# Patient Record
Sex: MALE | Race: WHITE | NOT HISPANIC OR LATINO | Employment: FULL TIME | URBAN - METROPOLITAN AREA
[De-identification: names, ages, dates, MRNs, and addresses within clinical notes are randomized per-mention and may not be internally consistent; named-entity substitution may affect disease eponyms.]

---

## 2017-12-07 ENCOUNTER — APPOINTMENT (EMERGENCY)
Dept: RADIOLOGY | Facility: HOSPITAL | Age: 57
End: 2017-12-07
Payer: COMMERCIAL

## 2017-12-07 ENCOUNTER — HOSPITAL ENCOUNTER (EMERGENCY)
Facility: HOSPITAL | Age: 57
Discharge: HOME | End: 2017-12-07
Attending: EMERGENCY MEDICINE
Payer: COMMERCIAL

## 2017-12-07 VITALS
WEIGHT: 180.56 LBS | SYSTOLIC BLOOD PRESSURE: 197 MMHG | BODY MASS INDEX: 23.18 KG/M2 | TEMPERATURE: 97.4 F | DIASTOLIC BLOOD PRESSURE: 90 MMHG | HEART RATE: 60 BPM | OXYGEN SATURATION: 96 % | RESPIRATION RATE: 16 BRPM

## 2017-12-07 DIAGNOSIS — S92.214A CLOSED NONDISPLACED FRACTURE OF CUBOID OF RIGHT FOOT, INITIAL ENCOUNTER: Primary | ICD-10-CM

## 2017-12-07 PROCEDURE — 73610 X-RAY EXAM OF ANKLE: CPT

## 2017-12-07 PROCEDURE — 73630 X-RAY EXAM OF FOOT: CPT

## 2017-12-07 PROCEDURE — 99283 EMERGENCY DEPT VISIT LOW MDM: CPT

## 2017-12-07 RX ORDER — IBUPROFEN 600 MG/1
600 TABLET ORAL ONCE
Status: COMPLETED | OUTPATIENT
Start: 2017-12-07 | End: 2017-12-07

## 2017-12-07 RX ORDER — HYDROCODONE BITARTRATE AND ACETAMINOPHEN 5; 325 MG/1; MG/1
1 TABLET ORAL EVERY 6 HOURS PRN
Qty: 12 TABLET | Refills: 0 | Status: SHIPPED | OUTPATIENT
Start: 2017-12-07 | End: 2017-12-10

## 2017-12-07 RX ADMIN — IBUPROFEN 600 MG: 600 TABLET ORAL at 09:13

## 2017-12-07 NOTE — DISCHARGE INSTRUCTIONS
No weight baring until further directed by Foot Doctor  Foot Fracture in [facility]:   A foot fracture is a break in one or more of the bones in your foot  Foot fractures are commonly caused by trauma, falls, or repeated stress injuries  DISCHARGE INSTRUCTIONS:   Medicines:   · Antibiotics: This medicine is given to help treat or prevent an infection caused by bacteria  · NSAIDs:  These medicines decrease swelling and pain  NSAIDs are available without a doctor's order  Ask which medicine is right for you  Ask how much to take and when to take it  Take as directed  NSAIDs can cause stomach bleeding and kidney problems if not taken correctly  · Pain medicine: You may be given a prescription medicine to decrease pain  Do not wait until the pain is severe before you take this medicine  · Take your medicine as directed  Contact your healthcare provider if you think your medicine is not helping or if you have side effects  Tell him of her if you are allergic to any medicine  Keep a list of the medicines, vitamins, and herbs you take  Include the amounts, and when and why you take them  Bring the list or the pill bottles to follow-up visits  Carry your medicine list with you in case of an emergency  Follow up with your healthcare provider or bone specialist as directed: You may need to return to have your splint or stitches removed  You may also need to return for tests to make sure your foot is healing  Write down your questions so you remember to ask them during your visits  Wound care:  Carefully wash the wound with soap and water  Dry the area and put on new, clean bandages as directed  Change your bandages when they get wet or dirty  Self-care:   · Rest:  You may need to rest your foot and avoid activities that cause pain  For stress fractures, you will need to avoid the activity that caused the fracture until it heals   Ask when you can return to your normal activities such as work and sports  · Ice:  Ice helps decrease swelling and pain  Ice may also help prevent tissue damage  Use an ice pack or put crushed ice in a plastic bag  Cover it with a towel, and place it on your foot for 15 to 20 minutes every hour as directed  · Elevate your foot:  Raise your foot at or above the level of your heart as often as you can  This will help decrease swelling and pain  Prop your foot on pillows or blankets to keep it elevated comfortably  · Physical therapy: Once your foot has healed, a physical therapist can teach you exercises to help improve movement and strength, and to decrease pain  Splint care:   · Check the skin around your splint daily for any redness or open areas  · Do not use a sharp or pointed object to scratch your skin under the splint  · Do not remove your splint unless your healthcare provider or orthopedic surgeon says it is okay  Bathing with a splint:  Do not let your splint get wet  Before bathing, cover the splint with a plastic bag  Tape the bag to your skin above the splint to seal out the water  Keep your foot out of the water in case the bag leaks  Ask when it is okay to take a bath or shower  Assistive devices: You may be given a hard-soled shoe to wear while your foot is healing  You also may need to use crutches to help you walk while your foot heals  It is important to use your crutches correctly  Ask for more information about how to use crutches  Contact your healthcare provider or bone specialist if:   · You have a fever  · You have new sores around your boot or splint  · You have new or worsening trouble moving your foot  · You notice a foul smell coming from under your splint  · Your boot or splint gets damaged  · You have questions or concerns about your condition or care  Return to the emergency department if:   · The pain in your injured foot gets worse even after you rest and take pain medicine      · The skin or toes of your foot become numb, swollen, cold, white, or blue  · You have more pain or swelling than you did before the splint was put on  · Your wound is draining fluid or pus  · Blood soaks through your bandage  · Your leg feels warm, tender, and painful  It may look swollen and red  · You suddenly feel lightheaded and short of breath  · You have chest pain when you take a deep breath or cough  You may cough up blood  © 2017 [facility] Information is for End User's use only and may not be sold, redistributed or otherwise used for commercial purposes  All illustrations and images included in CareNotes® are the copyrighted property of A D A M [employer] Inc  or Rafael Rincon  The above information is an  only  It is not intended as medical advice for individual conditions or treatments  Talk to your doctor, nurse or pharmacist before following any medical regimen to see if it is safe and effective for you

## 2017-12-07 NOTE — ED PROVIDER NOTES
History  Chief Complaint   Patient presents with    Foot Injury     63yo male who presents to ER for evaluation of right lateral foot injury  States he stepped off the curb onto a drainage grate when he rolled it inwards causing him to fall  Onset last night  He applied ice at the time but this morning pain, swelling, and bruising increased  Pain located along the lateral foot  He is having a hard time bearing weight on it  Denies other significant injury  Denies previous right foot/ankle surgery  History provided by:  Patient      Prior to Admission Medications   Prescriptions Last Dose Informant Patient Reported? Taking?   budesonide-formoterol (SYMBICORT) 160-4 5 mcg/act inhaler  Self Yes Yes   Sig: Inhale 2 puffs 2 (two) times a day  esomeprazole (NexIUM) 40 MG capsule  Self Yes Yes   Sig: Take 40 mg by mouth every morning before breakfast       Facility-Administered Medications: None       Past Medical History:   Diagnosis Date    COPD (chronic obstructive pulmonary disease) ([facility])     GERD (gastroesophageal reflux disease)        History reviewed  No pertinent surgical history  History reviewed  No pertinent family history  I have reviewed and agree with the history as documented  Social History   Substance Use Topics    Smoking status: Former Smoker     Years: 0 00    Smokeless tobacco: Never Used    Alcohol use Yes      Comment: occassional        Review of Systems   Constitutional: Negative for chills and fever  Musculoskeletal: Negative for joint swelling  Skin: Negative for rash and wound  Neurological: Negative for weakness and numbness         Physical Exam  ED Triage Vitals   Temperature Pulse Respirations Blood Pressure SpO2   12/07/17 0806 12/07/17 0806 12/07/17 0806 12/07/17 0806 12/07/17 0806   (!) 97 4 °F (36 3 °C) 65 18 (!) 198/94 96 %      Temp Source Heart Rate Source Patient Position - Orthostatic VS BP Location FiO2 (%)   12/07/17 0806 12/07/17 0850 12/07/17 8551 12/07/17 0806 --   Oral Monitor Lying Right arm       Pain Score       12/07/17 0806       Worst Possible Pain           Orthostatic Vital Signs  Vitals:    12/07/17 0806 12/07/17 0850   BP: (!) 198/94 (!) 197/90   Pulse: 65 60   Patient Position - Orthostatic VS: Lying Lying       Physical Exam   Constitutional: He appears well-developed and well-nourished  Cardiovascular: Intact distal pulses  Musculoskeletal:        Right ankle: Normal         Right foot: There is decreased range of motion, tenderness, bony tenderness and swelling  There is normal capillary refill, no crepitus and no deformity  Feet:    Skin: Skin is warm and dry  Nursing note and vitals reviewed  ED Medications  Medications   ibuprofen (MOTRIN) tablet 600 mg (600 mg Oral Given 12/7/17 0913)       Diagnostic Studies  Results Reviewed     None                 XR ankle 3+ views RIGHT   ED Interpretation by Juan Manuel Ramirez PA-C (12/07 0900)   Cuboid fracture      Final Result by Laura Mares MD (12/07 1759)      Acute fracture of the cuboid         Workstation performed: [id number]         XR foot 3+ views RIGHT   ED Interpretation by Juan Manuel Ramirez PA-C (12/07 0900)   Cuboid fracture      Final Result by Laura Mares MD (12/07 9677)      Acute fracture involving the lateral proximal portion of the cuboid towards the plantar surface  Brielle Zambrano is aware of this finding           Workstation performed: [id number]                    Procedures  Static Splint Application  Date/Time: 12/7/2017 9:24 AM  Performed by: Rahat Zarco by: Leslie Crain     Patient location:  Bedside  Procedure performed by emergency physician: No    Other Assisting Provider: Yes (comment) Shiv Moore  Tech)    Consent:     Consent obtained:  Verbal    Consent given by:  Patient    Risks discussed:  Pain  Universal protocol:     Procedure explained and questions answered to patient or proxy's satisfaction: yes      Patient identity confirmed:  Verbally with patient  Indication:     Indications: fracture    Pre-procedure details:     Sensation:  Normal  Procedure details:     Laterality:  Right    Location:  Foot    Splint type:  Short leg    Supplies:  Ortho-Glass  Post-procedure details:     Pain:  Improved    Sensation:  Normal    Neurovascular Exam: skin pink      Patient tolerance of procedure: Tolerated well, no immediate complications    Crutches also given       Phone Contacts  ED Phone Contact    ED Course  ED Course                                MDM  Number of Diagnoses or Management Options  Closed nondisplaced fracture of cuboid of right foot, initial encounter:      Amount and/or Complexity of Data Reviewed  Tests in the radiology section of CPT®: reviewed    Risk of Complications, Morbidity, and/or Mortality  General comments: Discussed with patient importance of being non weight baring until further directed by foot doctor, he understands and agrees to do so  I or my delegate reviewed this patient through the [facility] portal and did not find any evidence of narcotic abuse or doctor shopping  Patient Progress  Patient progress: stable    CritCare Time    Disposition  Final diagnoses:   Closed nondisplaced fracture of cuboid of right foot, initial encounter     Time reflects when diagnosis was documented in both MDM as applicable and the Disposition within this note     Time User Action Codes Description Comment    12/7/2017  9:22 AM Marielena March Add [G52 214A] Closed nondisplaced fracture of cuboid of right foot, initial encounter       ED Disposition     ED Disposition Condition Comment    Discharge  [facility] discharge to home/self care  Condition at discharge: Good        Follow-up Information     Follow up With Specialties Details Why Contact Info Additional Information    Peyton Perry DPM Podiatry In 3 days  [address] [place] [address]  [facility] Emergency Department Emergency Medicine  If symptoms worsen [address] [facility] [address]  [phone] [facility] ED, [address], [place], [place], [address]        Patient's Medications   Discharge Prescriptions    HYDROCODONE-ACETAMINOPHEN (NORCO) 5-325 MG PER TABLET    Take 1 tablet by mouth every 6 (six) hours as needed for pain for up to 3 days Max Daily Amount: 4 tablets       Start Date: 12/7/2017 End Date: 12/10/2017       Order Dose: 1 tablet       Quantity: 12 tablet    Refills: 0     No discharge procedures on file      ED Provider  Electronically Signed by           Ofelia Urias PA-C  12/07/17 Edison Cifuentes PA-C  12/07/17 2125

## 2018-07-29 ENCOUNTER — APPOINTMENT (EMERGENCY)
Dept: CT IMAGING | Facility: HOSPITAL | Age: 58
End: 2018-07-29
Payer: COMMERCIAL

## 2018-07-29 ENCOUNTER — HOSPITAL ENCOUNTER (EMERGENCY)
Facility: HOSPITAL | Age: 58
Discharge: HOME | End: 2018-07-29
Attending: EMERGENCY MEDICINE | Admitting: EMERGENCY MEDICINE
Payer: COMMERCIAL

## 2018-07-29 VITALS
HEART RATE: 90 BPM | DIASTOLIC BLOOD PRESSURE: 100 MMHG | RESPIRATION RATE: 18 BRPM | SYSTOLIC BLOOD PRESSURE: 189 MMHG | OXYGEN SATURATION: 95 % | TEMPERATURE: 98.5 F
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
## 2018-07-29 DIAGNOSIS — S09.90XA INJURY OF HEAD, INITIAL ENCOUNTER: ICD-10-CM

## 2018-07-29 DIAGNOSIS — S01.81XA FACIAL LACERATION, INITIAL ENCOUNTER: ICD-10-CM

## 2018-07-29 PROCEDURE — 70450 CT HEAD/BRAIN W/O DYE: CPT

## 2018-07-29 PROCEDURE — 70486 CT MAXILLOFACIAL W/O DYE: CPT

## 2018-07-29 PROCEDURE — 99284 EMERGENCY DEPT VISIT MOD MDM: CPT

## 2018-07-29 RX ORDER — OXYCODONE HYDROCHLORIDE AND ACETAMINOPHEN 5; 325 MG/1; MG/1
1 TABLET ORAL ONCE
Status: COMPLETED | OUTPATIENT
Start: 2018-07-29 | End: 2018-07-29

## 2018-07-29 RX ORDER — OXYCODONE HYDROCHLORIDE AND ACETAMINOPHEN 5; 325 MG/1; MG/1
1 TABLET ORAL EVERY 4 HOURS PRN
Qty: 5 TABLET | Refills: 0 | Status: SHIPPED | OUTPATIENT
Start: 2018-07-29 | End: 2018-08-08

## 2018-07-29 RX ORDER — ACETAMINOPHEN 325 MG/1
975 TABLET ORAL ONCE
Status: COMPLETED | OUTPATIENT
Start: 2018-07-29 | End: 2018-07-29

## 2018-07-29 RX ORDER — OXYCODONE HYDROCHLORIDE AND ACETAMINOPHEN 5; 325 MG/1; MG/1
1 TABLET ORAL EVERY 4 HOURS PRN
Qty: 5 TABLET | Refills: 0 | Status: SHIPPED | OUTPATIENT
Start: 2018-07-29 | End: 2018-07-29

## 2018-07-29 RX ADMIN — ACETAMINOPHEN 975 MG: 325 TABLET, FILM COATED ORAL at 18:38

## 2018-07-29 RX ADMIN — OXYCODONE HYDROCHLORIDE AND ACETAMINOPHEN 1 TABLET: 5; 325 TABLET ORAL at 19:24

## 2018-07-29 NOTE — ED NOTES
Unsure if tetanus UTD, checked our records, no recent tetanus noted     Edson Sterling, KOLTON  07/29/18 6836

## 2018-07-29 NOTE — DISCHARGE INSTRUCTIONS
Head Injury   WHAT YOU NEED TO KNOW:   What causes a head injury? A head injury is most often caused by a blow to the head  This may occur from a fall, bicycle injury, sports injury, being struck in the head, or a motor vehicle accident  What are the symptoms of a head injury? You may have an open wound, swelling, or bruising on your head  Right after the injury, you may be confused  Symptoms may last anywhere from a few hours to a few weeks  You may have any of the following:  · Mild to moderate headache    · Dizziness or loss of balance    · Nausea or vomiting    · Change in mood, such as feeling restless or irritable    · Trouble thinking, remembering, or concentrating    · Ringing in the ears or neck pain    · Drowsiness or decreased amount of energy    · Trouble sleeping  How is a head injury diagnosed? · Tell your healthcare provider about your injury and symptoms  The provider will do an exam to check your brain function  He or she will check how your pupils react to light  He will check your memory, hand grasp, and balance  · You may need a CT scan to check for bleeding or major damage to your skull or brain  You may be given contrast liquid to help the pictures show up better  Tell a healthcare provider if you have ever had an allergic reaction to contrast liquid  How is a head injury treated? You may be given medicine to decrease pain  Other treatments may depend on how severe your head injury is  How can I manage my symptoms? · Rest  or do quiet activities for 24 to 48 hours  Limit your time watching TV, using the computer, or doing tasks that require a lot of thinking  Slowly return to your normal activities as directed  Do not play sports or do activities that may cause you to get hit in the head  Ask your healthcare provider when you can return to sports  · Apply ice  on your head for 15 to 20 minutes every hour or as directed  Use an ice pack, or put crushed ice in a plastic bag  Cover it with a towel before you apply it to your skin  Ice helps prevent tissue damage and decreases swelling and pain  · Have someone stay with you for 24 hours  or as directed  This person can monitor you for complications and call 123  When you are awake the person should ask you a few questions to see if you are thinking clearly  An example would be to ask your name or your address  How can I help prevent another head injury? · Wear a helmet that fits properly  Do this when you play sports, or ride a bike, scooter, or skateboard  Helmets help decrease your risk of a serious head injury  Talk to your healthcare provider about other ways you can protect yourself if you play sports  · Wear your seat belt every time you are in a car  This helps to decrease your risk for a head injury if you are in a car accident  Call 911 or have someone else call for any of the following:   · You cannot be woken  · You have a seizure  · You stop responding to others or you faint  · You have blurry or double vision  · Your speech becomes slurred or confused  · You have arm or leg weakness, loss of feeling, or new problems with coordination  · Your pupils are larger than usual or one pupil is a different size than the other  · You have blood or clear fluid coming out of your ears or nose  When should I seek immediate care? · You have repeated or forceful vomiting  · You feel confused  · Your headache gets worse or becomes severe  · You or someone caring for you notices that you are harder to wake than usual   When should I contact my healthcare provider? · Your symptoms last longer than 6 weeks after the injury  · You have questions or concerns about your condition or care  CARE AGREEMENT:   You have the right to help plan your care  Learn about your health condition and how it may be treated  Discuss treatment options with your caregivers to decide what care you want to receive  You always have the right to refuse treatment  The above information is an  only  It is not intended as medical advice for individual conditions or treatments  Talk to your doctor, nurse or pharmacist before following any medical regimen to see if it is safe and effective for you  © 2017 [employer] Information is for End User's use only and may not be sold, redistributed or otherwise used for commercial purposes  All illustrations and images included in CareNotes® are the copyrighted property of [employer]  or Rafael French   WHAT YOU NEED TO KNOW:   A laceration is an injury to the skin and the soft tissue underneath it  Lacerations happen when you are cut or hit by something  They can happen anywhere on the body  DISCHARGE INSTRUCTIONS:   Return to the emergency department if:   · You have heavy bleeding or bleeding that does not stop after 10 minutes of holding firm, direct pressure over the wound  · Your wound opens up  Contact your healthcare provider if:   · You have a fever or chills  · Your laceration is red, warm, or swollen  · You have red streaks on your skin coming from your wound  · You have white or yellow drainage from the wound that smells bad  · You have pain that gets worse, even after treatment  · You have questions or concerns about your condition or care  Medicines:   · Prescription pain medicine  may be given  Ask how to take this medicine safely  · Antibiotics  help treat or prevent a bacterial infection  · Take your medicine as directed  Contact your healthcare provider if you think your medicine is not helping or if you have side effects  Tell him or her if you are allergic to any medicine  Keep a list of the medicines, vitamins, and herbs you take  Include the amounts, and when and why you take them  Bring the list or the pill bottles to follow-up visits   Carry your medicine list with you in case of an emergency  Care for your wound as directed:   · Do not get your wound wet  until your healthcare provider says it is okay  Do not soak your wound in water  Do not go swimming until your healthcare provider says it is okay  Carefully wash the wound with soap and water  Gently pat the area dry or allow it to air dry  · Change your bandages  when they get wet, dirty, or after washing  Apply new, clean bandages as directed  Do not apply elastic bandages or tape too tight  Do not put powders or lotions over your incision  · Apply antibiotic ointment as directed  Your healthcare provider may give you antibiotic ointment to put over your wound if you have stitches  If you have strips of tape over your incision, let them dry up and fall off on their own  If they do not fall off within 14 days, gently remove them  If you have glue over your wound, do not remove or pick at it  If your glue comes off, do not replace it with glue that you have at home  · Check your wound every day for signs of infection such as swelling, redness, or pus  Self-care:   · Apply ice  on your wound for 15 to 20 minutes every hour or as directed  Use an ice pack, or put crushed ice in a plastic bag  Cover it with a towel  Ice helps prevent tissue damage and decreases swelling and pain  · Use a splint as directed  A splint will decrease movement and stress on your wound  It may help it heal faster  A splint may be used for lacerations over joints or areas of your body that bend  Ask your healthcare provider how to apply and remove a splint  · Decrease scarring of your wound  by applying ointments as directed  Do not apply ointments until your healthcare provider says it is okay  You may need to wait until your wound is healed  Ask which ointment to buy and how often to use it  After your wound is healed, use sunscreen over the area when you are out in the sun   You should do this for at least 6 months to 1 year after your injury  Follow up with your healthcare provider as directed: You may need to follow up in 24 to 48 hours to have your wound checked for infection  You will need to return in 3 to 14 days if you have stitches or staples so they can be removed  Care for your wound as directed to prevent infection and help it heal  Write down your questions so you remember to ask them during your visits  © 2017 [employer] Information is for End User's use only and may not be sold, redistributed or otherwise used for commercial purposes  All illustrations and images included in CareNotes® are the copyrighted property of [employer]  or Rafael Rincon  The above information is an  only  It is not intended as medical advice for individual conditions or treatments  Talk to your doctor, nurse or pharmacist before following any medical regimen to see if it is safe and effective for you  Motor Vehicle Accident   WHAT YOU NEED TO KNOW:   A motor vehicle accident (MVA) can cause injury from the impact or from being thrown around inside the car  You may have a bruise on your abdomen, chest, or neck from the seatbelt  You may also have pain in your face, neck, or back  You may have pain in your knee, hip, or thigh if your body hits the dash or the steering wheel  Muscle pain is commonly worse 1 to 2 days after an MVA  DISCHARGE INSTRUCTIONS:   Call 911 if:   · You have new or worsening chest pain or shortness of breath  Return to the emergency department if:   · You have new or worsening pain in your abdomen  · You have nausea and vomiting that does not get better  · You have a severe headache  · You have weakness, tingling, or numbness in your arms or legs  · You have new or worsening pain that makes it hard for you to move  Contact your healthcare provider if:   · You have pain that develops 2 to 3 days after the MVA       · You have questions or concerns about your condition or care  Medicines:   · Pain medicine: You may be given medicine to take away or decrease pain  Do not wait until the pain is severe before you take your medicine  · NSAIDs , such as ibuprofen, help decrease swelling, pain, and fever  This medicine is available with or without a doctor's order  NSAIDs can cause stomach bleeding or kidney problems in certain people  If you take blood thinner medicine, always ask if NSAIDs are safe for you  Always read the medicine label and follow directions  Do not give these medicines to children under 10months of age without direction from your child's healthcare provider  · Take your medicine as directed  Contact your healthcare provider if you think your medicine is not helping or if you have side effects  Tell him of her if you are allergic to any medicine  Keep a list of the medicines, vitamins, and herbs you take  Include the amounts, and when and why you take them  Bring the list or the pill bottles to follow-up visits  Carry your medicine list with you in case of an emergency  Follow up with your healthcare provider as directed:  Write down your questions so you remember to ask them during your visits  Safety tips:   · Always wear your seatbelt  This will help reduce serious injury from an MVA  · Use child safety seats  Your child needs to ride in a child safety seat made for his age, height, and weight  Ask your healthcare provider for more information about child safety seats  · Decrease speed  Drive the speed limit to reduce your risk for an MVA  · Do not drive if you are tired  You will react more slowly when you are tired  The slowed reaction time will increase your risk for an MVA  · Do not talk or text on your cell phone while you drive  You cannot respond fast enough in an emergency if you are distracted by texts or conversations  · Do not drink and drive  Use a designated    Call a taxi or get a ride home with someone if you have been drinking  Do not let your friends drive if they have been drinking alcohol  · Do not use illegal drugs and drive  You may be more tired or take risks that you normally would not take  Do not drive after you take prescription medicines that make you sleepy  Self-care:   · Use ice and heat  Ice helps decrease swelling and pain  Ice may also help prevent tissue damage  Use an ice pack, or put crushed ice in a plastic bag  Cover it with a towel and apply to your injured area for 15 to 20 minutes every hour, or as directed  After 2 days, use a heating pad on your injured area  Use heat as directed  · Gently stretch  Use gentle exercises to stretch your muscles after an MVA  Ask your healthcare provider for exercises you can do  © 2017 [employer] Information is for End User's use only and may not be sold, redistributed or otherwise used for commercial purposes  All illustrations and images included in CareNotes® are the copyrighted property of [employer]  or Rafael Mckenzie  The above information is an  only  It is not intended as medical advice for individual conditions or treatments  Talk to your doctor, nurse or pharmacist before following any medical regimen to see if it is safe and effective for you  Skin Adhesive Care   WHAT YOU NEED TO KNOW:   What is skin adhesive? Skin adhesive is medical glue used to close wounds  It is a substitute for staples and stitches  Skin adhesive wound closures take less time and do not require anesthesia  You have less pain and a lower risk of infection than with staples or stitches  Skin adhesive will fall off after the wound is healed  How do I care for the skin adhesive that covers my wound? · Keep your wound clean and dry  for 1 to 5 days  You can shower 24 hours after the skin adhesive is applied  Lightly pat your wound dry after you shower      · Do not soak  your wound in water, such as in a bath or hot tub  · Do not scrub  your wound or pick at the adhesive  This can make your wound reopen  · Do not apply ointments  to your wound  These include antibiotic and other ointments that contain petroleum jelly  These products will remove skin adhesive and reopen your wound  When should I contact my healthcare provider? · You have a fever  · Your wound is red, swollen, and warm to touch  · You have questions or concerns about your condition or care  When should I seek immediate care? · Your wound is draining pus  · Your wound opens  CARE AGREEMENT:   You have the right to help plan your care  Learn about your health condition and how it may be treated  Discuss treatment options with your caregivers to decide what care you want to receive  You always have the right to refuse treatment  The above information is an  only  It is not intended as medical advice for individual conditions or treatments  Talk to your doctor, nurse or pharmacist before following any medical regimen to see if it is safe and effective for you  © 2017 [employer] Information is for End User's use only and may not be sold, redistributed or otherwise used for commercial purposes  All illustrations and images included in CareNotes® are the copyrighted property of A [employer]  or Rafael Rincon
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
## 2018-12-07 ENCOUNTER — APPOINTMENT (OUTPATIENT)
Dept: URGENT CARE | Facility: MEDICAL CENTER | Age: 58
End: 2018-12-07
Payer: OTHER MISCELLANEOUS

## 2018-12-07 PROCEDURE — 90715 TDAP VACCINE 7 YRS/> IM: CPT

## 2019-01-08 ENCOUNTER — TELEPHONE (OUTPATIENT)
Dept: UROLOGY | Facility: AMBULATORY SURGERY CENTER | Age: 59
End: 2019-01-08

## 2019-01-08 NOTE — TELEPHONE ENCOUNTER
Reason for appointment/Complaint/Diagnosis : KIDNEY STONES, CYST OF KIDNEY    Insurance: BLUE CROSS    History of Cancer? MINOR SKIN                        If yes, what kind? Previous urologist?   30YR AGO, CANT REMEMBER NAME              Records requested/where? ALREADY REC    Outside testing/where? Location Preference for office visit?  [place]

## 2019-02-14 ENCOUNTER — OFFICE VISIT (OUTPATIENT)
Dept: UROLOGY | Facility: CLINIC | Age: 59
End: 2019-02-14
Payer: COMMERCIAL

## 2019-02-14 VITALS
SYSTOLIC BLOOD PRESSURE: 116 MMHG | BODY MASS INDEX: 26.49 KG/M2 | DIASTOLIC BLOOD PRESSURE: 82 MMHG | HEIGHT: 74 IN | HEART RATE: 71 BPM | WEIGHT: 206.4 LBS

## 2019-02-14 DIAGNOSIS — N20.0 CALCULUS OF KIDNEY: Primary | ICD-10-CM

## 2019-02-14 DIAGNOSIS — Z12.5 SCREENING FOR PROSTATE CANCER: ICD-10-CM

## 2019-02-14 PROCEDURE — 99244 OFF/OP CNSLTJ NEW/EST MOD 40: CPT | Performed by: UROLOGY

## 2019-02-14 RX ORDER — FAMOTIDINE 40 MG/1
TABLET, FILM COATED ORAL
COMMUNITY
Start: 2019-02-12

## 2019-02-14 RX ORDER — HYDROCODONE BITARTRATE AND ACETAMINOPHEN 5; 325 MG/1; MG/1
TABLET ORAL
COMMUNITY

## 2019-02-14 RX ORDER — OXYCODONE HYDROCHLORIDE AND ACETAMINOPHEN 5; 325 MG/1; MG/1
1 TABLET ORAL 2 TIMES DAILY PRN
Refills: 0 | COMMUNITY
Start: 2019-02-04

## 2019-02-14 RX ORDER — OXYCODONE HYDROCHLORIDE AND ACETAMINOPHEN 5; 325 MG/1; MG/1
TABLET ORAL EVERY 12 HOURS
COMMUNITY

## 2019-02-14 RX ORDER — FAMOTIDINE 40 MG/1
TABLET, FILM COATED ORAL
COMMUNITY
Start: 2018-08-23

## 2019-02-14 RX ORDER — HYDROCODONE BITARTRATE AND ACETAMINOPHEN 5; 300 MG/1; MG/1
TABLET ORAL
COMMUNITY

## 2019-08-05 ENCOUNTER — APPOINTMENT (OUTPATIENT)
Dept: URGENT CARE | Age: 59
End: 2019-08-05
Payer: OTHER MISCELLANEOUS

## 2019-08-05 ENCOUNTER — TRANSCRIBE ORDERS (OUTPATIENT)
Dept: ADMINISTRATIVE | Age: 59
End: 2019-08-05

## 2019-08-05 ENCOUNTER — APPOINTMENT (OUTPATIENT)
Dept: RADIOLOGY | Age: 59
End: 2019-08-05
Payer: OTHER MISCELLANEOUS

## 2019-08-05 DIAGNOSIS — T14.90XA INJURY: Primary | ICD-10-CM

## 2019-08-05 DIAGNOSIS — T14.90XA INJURY: ICD-10-CM

## 2019-08-05 PROCEDURE — 99284 EMERGENCY DEPT VISIT MOD MDM: CPT | Performed by: PREVENTIVE MEDICINE

## 2019-08-05 PROCEDURE — 73140 X-RAY EXAM OF FINGER(S): CPT

## 2019-08-05 PROCEDURE — G0383 LEV 4 HOSP TYPE B ED VISIT: HCPCS | Performed by: PREVENTIVE MEDICINE

## 2019-12-02 ENCOUNTER — APPOINTMENT (EMERGENCY)
Dept: RADIOLOGY | Facility: HOSPITAL | Age: 59
End: 2019-12-02
Payer: COMMERCIAL

## 2019-12-02 ENCOUNTER — HOSPITAL ENCOUNTER (EMERGENCY)
Facility: HOSPITAL | Age: 59
Discharge: HOME | End: 2019-12-02
Attending: EMERGENCY MEDICINE | Admitting: EMERGENCY MEDICINE
Payer: COMMERCIAL

## 2019-12-02 VITALS
WEIGHT: 206.35 LBS | SYSTOLIC BLOOD PRESSURE: 178 MMHG | RESPIRATION RATE: 18 BRPM | TEMPERATURE: 97.8 F | BODY MASS INDEX: 26.49 KG/M2 | OXYGEN SATURATION: 96 % | HEART RATE: 60 BPM | DIASTOLIC BLOOD PRESSURE: 88 MMHG

## 2019-12-02 DIAGNOSIS — R07.9 CHEST PAIN, UNSPECIFIED: Primary | ICD-10-CM

## 2019-12-02 LAB
ANION GAP SERPL CALCULATED.3IONS-SCNC: 9 MMOL/L (ref 4–13)
BASOPHILS # BLD AUTO: 0.07 THOUSANDS/ΜL (ref 0–0.1)
BASOPHILS NFR BLD AUTO: 1 % (ref 0–1)
BUN SERPL-MCNC: 17 MG/DL (ref 5–25)
CALCIUM SERPL-MCNC: 9.1 MG/DL (ref 8.3–10.1)
CHLORIDE SERPL-SCNC: 106 MMOL/L (ref 100–108)
CO2 SERPL-SCNC: 28 MMOL/L (ref 21–32)
CREAT SERPL-MCNC: 1 MG/DL (ref 0.6–1.3)
EOSINOPHIL # BLD AUTO: 0.15 THOUSAND/ΜL (ref 0–0.61)
EOSINOPHIL NFR BLD AUTO: 2 % (ref 0–6)
ERYTHROCYTE [DISTWIDTH] IN BLOOD BY AUTOMATED COUNT: 12.7 % (ref 11.6–15.1)
GFR SERPL CREATININE-BSD FRML MDRD: 83 ML/MIN/1.73SQ M
GLUCOSE SERPL-MCNC: 90 MG/DL (ref 65–140)
HCT VFR BLD AUTO: 47.9 % (ref 36.5–49.3)
HGB BLD-MCNC: 16.1 G/DL (ref 12–17)
IMM GRANULOCYTES # BLD AUTO: 0.01 THOUSAND/UL (ref 0–0.2)
IMM GRANULOCYTES NFR BLD AUTO: 0 % (ref 0–2)
LYMPHOCYTES # BLD AUTO: 1.69 THOUSANDS/ΜL (ref 0.6–4.47)
LYMPHOCYTES NFR BLD AUTO: 23 % (ref 14–44)
MCH RBC QN AUTO: 30.8 PG (ref 26.8–34.3)
MCHC RBC AUTO-ENTMCNC: 33.6 G/DL (ref 31.4–37.4)
MCV RBC AUTO: 92 FL (ref 82–98)
MONOCYTES # BLD AUTO: 0.47 THOUSAND/ΜL (ref 0.17–1.22)
MONOCYTES NFR BLD AUTO: 6 % (ref 4–12)
NEUTROPHILS # BLD AUTO: 4.97 THOUSANDS/ΜL (ref 1.85–7.62)
NEUTS SEG NFR BLD AUTO: 68 % (ref 43–75)
NRBC BLD AUTO-RTO: 0 /100 WBCS
PLATELET # BLD AUTO: 206 THOUSANDS/UL (ref 149–390)
PMV BLD AUTO: 10.6 FL (ref 8.9–12.7)
POTASSIUM SERPL-SCNC: 4 MMOL/L (ref 3.5–5.3)
RBC # BLD AUTO: 5.22 MILLION/UL (ref 3.88–5.62)
SODIUM SERPL-SCNC: 143 MMOL/L (ref 136–145)
TROPONIN I SERPL-MCNC: 0 NG/ML
WBC # BLD AUTO: 7.36 THOUSAND/UL (ref 4.31–10.16)

## 2019-12-02 PROCEDURE — 93005 ELECTROCARDIOGRAM TRACING: CPT

## 2019-12-02 PROCEDURE — 85025 COMPLETE CBC W/AUTO DIFF WBC: CPT | Performed by: PSYCHIATRY & NEUROLOGY

## 2019-12-02 PROCEDURE — 99285 EMERGENCY DEPT VISIT HI MDM: CPT

## 2019-12-02 PROCEDURE — 36415 COLL VENOUS BLD VENIPUNCTURE: CPT | Performed by: PSYCHIATRY & NEUROLOGY

## 2019-12-02 PROCEDURE — 80048 BASIC METABOLIC PNL TOTAL CA: CPT | Performed by: PSYCHIATRY & NEUROLOGY

## 2019-12-02 PROCEDURE — 99285 EMERGENCY DEPT VISIT HI MDM: CPT | Performed by: EMERGENCY MEDICINE

## 2019-12-02 PROCEDURE — 71046 X-RAY EXAM CHEST 2 VIEWS: CPT

## 2019-12-02 PROCEDURE — 84484 ASSAY OF TROPONIN QUANT: CPT | Performed by: PSYCHIATRY & NEUROLOGY

## 2019-12-02 RX ORDER — SODIUM CHLORIDE 9 MG/ML
3 INJECTION INTRAVENOUS AS NEEDED
Status: DISCONTINUED | OUTPATIENT
Start: 2019-12-02 | End: 2019-12-02 | Stop reason: HOSPADM

## 2019-12-02 NOTE — ED PROVIDER NOTES
History  Chief Complaint   Patient presents with    Chest Pain     pt c/o chest pain on and off for weeks  denies chest pain currently  sent in by PCP for further eval     Grisel Cortes is a 63 y o  male with past medical history significant for COPD and GERD who presents for chest pain which occurred at least 2 weeks ago and has not recurred  Pt states he was at his PCP today for a well check and recalled that he had several episodes of left/substernal chest pain radiating to his left arm which lasted 15-20 mins at most over the course of about 3 days more than 2 weeks ago and the pain has not reoccurred since then  Pt is an  and frequently lifts heavy objects and he thinks he possibly had a minor injury a few weeks ago as a result  Pt denies diaphoresis, nausea, vomiting, fever, chills, blurry vision, shortness of breath during episodes of chest pain  History provided by:  Patient and significant other   used: No    Chest Pain   Pain location:  Substernal area, L chest and L lateral chest  Pain quality: sharp and stabbing    Pain radiates to:  L shoulder and L arm  Pain radiates to the back: no    Timing:  Sporadic  Progression:  Resolved  Chronicity:  New  Context: not breathing    Relieved by:  Nothing  Worsened by:  Nothing tried  Ineffective treatments:  None tried  Associated symptoms: no cough, no dizziness, no fever, no nausea, no palpitations, no shortness of breath, not vomiting and no weakness        Prior to Admission Medications   Prescriptions Last Dose Informant Patient Reported? Taking? HYDROcodone-acetaminophen (NORCO) 5-325 mg per tablet  Self Yes No   Sig: TAKE 1 TABLET BY MOUTH EVERY DAY AS NEEDED   HYDROcodone-acetaminophen (VICODIN) 5-300 MG per tablet  Self Yes No   Sig: Vicodin 5 mg-300 mg tablet   budesonide-formoterol (SYMBICORT) 160-4 5 mcg/act inhaler  Self Yes No   Sig: Inhale 2 puffs 2 (two) times a day     esomeprazole (NexIUM) 40 MG capsule  Self Yes No   Sig: Take 40 mg by mouth every morning before breakfast    famotidine (PEPCID) 40 MG tablet  Self Yes No   famotidine (PEPCID) 40 MG tablet  Self Yes No   Sig: famotidine 40 mg tablet   fluticasone-umeclidinium-vilanterol (TRELEGY ELLIPTA) 100-62 5-25 MCG/INH inhaler  Self Yes No   Si puff od   fluticasone-umeclidinium-vilanterol (TRELEGY ELLIPTA) 100-62 5-25 MCG/INH inhaler  Self Yes No   Sig: Inhale 1 puff   oxyCODONE-acetaminophen (PERCOCET) 5-325 mg per tablet  Self Yes No   Sig: Every 12 hours   oxyCODONE-acetaminophen (PERCOCET) 5-325 mg per tablet  Self Yes No   Sig: Take 1 tablet by mouth 2 (two) times a day as needed      Facility-Administered Medications: None       Past Medical History:   Diagnosis Date    Cancer ([facility] [address] )     skin    [facility]ch[facility] disease[facility])     GERD (gastroesophageal reflux disease)        Past Surgical History:   Procedure Laterality Date    HERNIA REPAIR         Family History   Problem Relation Age of Onset    Hypertension Mother      I have reviewed and agree with the history as documented  Social History     Tobacco Use    Smoking status: Former Smoker     Years: 0 00    Smokeless tobacco: Never Used   Substance Use Topics    Alcohol use: Yes     Comment: occassional    Drug use: No        Review of Systems   Constitutional: Negative for activity change, chills, fever and unexpected weight change  Respiratory: Negative for cough, choking, chest tightness and shortness of breath  Cardiovascular: Positive for chest pain  Negative for palpitations and leg swelling  Gastrointestinal: Negative for constipation, diarrhea, nausea and vomiting  Musculoskeletal: Negative for gait problem  Neurological: Negative for dizziness, weakness and light-headedness         Physical Exam  ED Triage Vitals   Temperature Pulse Respirations Blood Pressure SpO2   19 1606 19 1606 19 1606 19 1606 19 1606   97 8 °F (36 6 °C) 68 16 (!) 207/87 98 %      Temp src Heart Rate Source Patient Position - Orthostatic VS BP Location FiO2 (%)   -- 12/02/19 1745 12/02/19 1745 12/02/19 1745 --    Monitor Sitting Right arm       Pain Score       12/02/19 1606       No Pain             Orthostatic Vital Signs  Vitals:    12/02/19 1606 12/02/19 1745   BP: (!) 207/87 (!) 178/88   Pulse: 68 60   Patient Position - Orthostatic VS:  Sitting       Physical Exam   Constitutional: He is oriented to person, place, and time  He appears well-developed and well-nourished  No distress  HENT:   Head: Normocephalic and atraumatic  Eyes: Pupils are equal, round, and reactive to light  Conjunctivae and EOM are normal    Neck: Normal range of motion  Neck supple  Cardiovascular: Normal rate, regular rhythm, normal heart sounds and intact distal pulses  No murmur heard  Pulmonary/Chest: Effort normal and breath sounds normal  No respiratory distress  He has no wheezes  He has no rhonchi  He has no rales  Abdominal: Soft  Bowel sounds are normal    Musculoskeletal: Normal range of motion  Neurological: He is alert and oriented to person, place, and time  Skin: Skin is warm and dry  Capillary refill takes less than 2 seconds  Psychiatric: He has a normal mood and affect  Nursing note and vitals reviewed        ED Medications  Medications   sodium chloride (PF) 0 9 % injection 3 mL (has no administration in time range)       Diagnostic Studies  Results Reviewed     Procedure Component Value Units Date/Time    Troponin I [[id number]] Collected:  12/02/19 1701    Lab Status:  Final result Specimen:  Blood from Arm, Right Updated:  12/02/19 1729     Troponin I 0 00 ng/mL     Basic metabolic panel [[id number]] Collected:  12/02/19 1701    Lab Status:  Final result Specimen:  Blood from Arm, Right Updated:  12/02/19 1720     Sodium 143 mmol/L      Potassium 4 0 mmol/L      Chloride 106 mmol/L      CO2 28 mmol/L      ANION GAP 9 mmol/L      BUN 17 mg/dL Creatinine 1 00 mg/dL      Glucose 90 mg/dL      Calcium 9 1 mg/dL      eGFR 83 ml/min/1 73sq m     Narrative:       [place] guidelines for Chronic Kidney Disease (CKD):     Stage 1 with normal or high GFR (GFR > 90 mL/min/1 73 square meters)    Stage 2 Mild CKD (GFR = 60-89 mL/min/1 73 square meters)    Stage 3A Moderate CKD (GFR = 45-59 mL/min/1 73 square meters)    Stage 3B Moderate CKD (GFR = 30-44 mL/min/1 73 square meters)    Stage 4 Severe CKD (GFR = 15-29 mL/min/1 73 square meters)    Stage 5 End Stage CKD (GFR <15 mL/min/1 73 square meters)  Note: GFR calculation is accurate only with a steady state creatinine    CBC and differential [[id number]] Collected:  12/02/19 1701    Lab Status:  Final result Specimen:  Blood from Arm, Right Updated:  12/02/19 1709     WBC 7 36 Thousand/uL      RBC 5 22 Million/uL      Hemoglobin 16 1 g/dL      Hematocrit 47 9 %      MCV 92 fL      MCH 30 8 pg      MCHC 33 6 g/dL      RDW 12 7 %      MPV 10 6 fL      Platelets 238 Thousands/uL      nRBC 0 /100 WBCs      Neutrophils Relative 68 %      Immat GRANS % 0 %      Lymphocytes Relative 23 %      Monocytes Relative 6 %      Eosinophils Relative 2 %      Basophils Relative 1 %      Neutrophils Absolute 4 97 Thousands/µL      Immature Grans Absolute 0 01 Thousand/uL      Lymphocytes Absolute 1 69 Thousands/µL      Monocytes Absolute 0 47 Thousand/µL      Eosinophils Absolute 0 15 Thousand/µL      Basophils Absolute 0 07 Thousands/µL                  X-ray chest 2 views   ED Interpretation by Kayla Allen DO (12/02 1708)   Flattened hemidiaphragms  No infiltrates              Procedures  ECG 12 Lead Documentation Only  Date/Time: 12/2/2019 4:48 PM  Performed by: Macrina Washington MD  Authorized by: Macrnia Washington MD     Indications / Diagnosis:  Chest pain  ECG reviewed by me, the ED Provider: yes    Patient location:  ED  Previous ECG:     Previous ECG:  Compared to current    Similarity: No change  Interpretation:     Interpretation: normal    Rate:     ECG rate:  60    ECG rate assessment: normal    Rhythm:     Rhythm: sinus rhythm    Ectopy:     Ectopy: none    QRS:     QRS axis:  Normal    QRS intervals:  Normal  Conduction:     Conduction: normal    ST segments:     ST segments:  Normal  T waves:     T waves: normal            ED Course         HEART Risk Score      Most Recent Value   History  0 Filed at: 12/02/2019 1732   ECG  0 Filed at: 12/02/2019 1732   Age  1 Filed at: 12/02/2019 1732   Risk Factors  1 Filed at: 12/02/2019 1732   Troponin  0 Filed at: 12/02/2019 1732   Heart Score Risk Calculator   History  0 Filed at: 12/02/2019 1732   ECG  0 Filed at: 12/02/2019 1732   Age  1 Filed at: 12/02/2019 1732   Risk Factors  1 Filed at: 12/02/2019 1732   Troponin  0 Filed at: 12/02/2019 1732   HEART Score  2 Filed at: 12/02/2019 1732   HEART Score  2 Filed at: 12/02/2019 1732                            MDM  Number of Diagnoses or Management Options  Chest pain, unspecified: new and requires workup  Diagnosis management comments: Mackenzie Mccarthy is a 63 y o  Male with no past cardiac history who presented for follow up on chest pain experienced > 2 weeks ago which resolved and has not returned  He went to his PCP who recommended he present to the ED for troponin's and stress test follow up  Pt labs were unremarkable, troponin was 0 00, CXR was unremarkable, HEART score of 2 from age and 2 risk factors  Pt counseled that stress test prescription should come from PCP or cardiologist so that there is adequate follow up as indicated  Pt given referral for cardiology should he desire to follow up with cardiology instead of PCP for stress test  Pt BP was elevated at presentation however on chart check, pt typically between 110-130 SBP and BP decreased during his ED stay  Pt and his wife have no further concerns and pt was discharged home         Amount and/or Complexity of Data Reviewed  Clinical lab tests: ordered and reviewed  Tests in the radiology section of CPT®: ordered and reviewed  Review and summarize past medical records: yes  Independent visualization of images, tracings, or specimens: yes    Risk of Complications, Morbidity, and/or Mortality  Presenting problems: moderate  Diagnostic procedures: moderate  Management options: moderate    Patient Progress  Patient progress: stable        Disposition  Final diagnoses:   Chest pain, unspecified     Time reflects when diagnosis was documented in both MDM as applicable and the Disposition within this note     Time User Action Codes Description Comment    12/2/2019  5:46 PM Bella Del Castillociro Add [R07 9] Chest pain, unspecified       ED Disposition     ED Disposition Condition Date/Time Comment    Discharge Stable Mon Dec 2, 2019  5:46 PM Hina Crowe discharge to home/self care              Follow-up Information     Follow up With Specialties Details Why Contact Info Additional Information    Dilcia Daly MD Internal Medicine Schedule an appointment as soon as possible for a visit in 1 week have your PCP order an excersise stress test, if at presentation you require a nuclear stress test, they can change it at that time [id number]  [facility] Cardiology Schedule an appointment as soon as possible for a visit  if you would like cardiology follow up [phone] Cardiology [address] [place] [address], [facility]          Discharge Medication List as of 12/2/2019  5:49 PM      CONTINUE these medications which have NOT CHANGED    Details   budesonide-formoterol (SYMBICORT) 160-4 5 mcg/act inhaler Inhale 2 puffs 2 (two) times a day , Historical Med      esomeprazole (NexIUM) 40 MG capsule Take 40 mg by mouth every morning before breakfast , Historical Med      !! famotidine (PEPCID) 40 MG tablet Starting Tue 2/12/2019, Historical Med      !! famotidine (PEPCID) 40 MG tablet famotidine 40 mg tablet, Historical Med      !! fluticasone-umeclidinium-vilanterol (TRELEGY ELLIPTA) 100-62 5-25 MCG/INH inhaler 1 puff od, Historical Med      !! fluticasone-umeclidinium-vilanterol (TRELEGY ELLIPTA) 100-62 5-25 MCG/INH inhaler Inhale 1 puff, Starting Thu 8/23/2018, Historical Med      HYDROcodone-acetaminophen (NORCO) 5-325 mg per tablet TAKE 1 TABLET BY MOUTH EVERY DAY AS NEEDED, Historical Med      HYDROcodone-acetaminophen (VICODIN) 5-300 MG per tablet Vicodin 5 mg-300 mg tablet, Historical Med      !! oxyCODONE-acetaminophen (PERCOCET) 5-325 mg per tablet Every 12 hours, Historical Med      !! oxyCODONE-acetaminophen (PERCOCET) 5-325 mg per tablet Take 1 tablet by mouth 2 (two) times a day as needed, Starting Mon 2/4/2019, Historical Med       !! - Potential duplicate medications found  Please discuss with provider  No discharge procedures on file  ED Provider  Attending physically available and evaluated Wang Jose QUIROGA managed the patient along with the ED Attending      Electronically Signed by         Luis Gerber MD  12/02/19 9770

## 2019-12-02 NOTE — ED ATTENDING ATTESTATION
12/2/2019  Vanda QUIROGA DO, saw and evaluated the patient  I have discussed the patient with the resident/non-physician practitioner and agree with the resident's/non-physician practitioner's findings, Plan of Care, and MDM as documented in the resident's/non-physician practitioner's note, except where noted  All available labs and Radiology studies were reviewed  I was present for key portions of any procedure(s) performed by the resident/non-physician practitioner and I was immediately available to provide assistance  At this point I agree with the current assessment done in the Emergency Department  I have conducted an independent evaluation of this patient a history and physical is as follows:    Patient is a 60-year-old male with a history of COPD who presents with an episodes of chest pain  Patient states that the episodes of chest pain occurred 2 weeks ago  He describes a sharp chest pain which lasted minutes at a time  It occurred several times over the course of the day  He has not had any chest pain since  He mentioned the episode during his routine visit with his PCP today  His PCP performed an EKG in the office and sent him to the emergency department for further evaluation  Patient is asymptomatic at this time  He specifically denies any chest pain, shortness of breath, diaphoresis, vomiting or other complaints  On exam, patient is resting comfortably  RRR  Breath sounds normal   No lower extremity edema or calf tenderness  Patient was initially hypertensive on exam but blood pressure has trended down without intervention  Patient remains asymptomatic  EKG and troponin with no evidence of acute ischemia  Do not suspect ACS, aortic dissection, pneumothorax, pericardial tamponade, pulmonary embolism, esophageal rupture as cause of chest pain  HEART score completed and patient is considered low risk for adverse cardiac event   Shared decision making used and patient prefers discharge and outpatient follow up  Patient will follow up with PCP to facilitate further workup  Advised to return to ED immediately if symptoms return  ED Course         Critical Care Time  ECG 12 Lead Documentation Only  Date/Time: 12/2/2019 4:47 PM  Performed by: Víctor Ahmadi DO  Authorized by: Víctor Ahmadi DO     ECG reviewed by me, the ED Provider: yes    Patient location:  ED  Previous ECG:     Previous ECG:  Compared to current    Similarity:  No change    Comparison to cardiac monitor: Yes    Comments:      Normal sinus rhythm at a rate 60 beats per minute  Normal intervals  Normal axis  Normal QRS  No ST T wave abnormalities  Similar to previous from 02/18/2013

## 2019-12-03 NOTE — PROGRESS NOTES
2/14/2019    Lorena Bergeron  1960  [id number]    Discussion and Plan    Dietary and hydration recommendations provided to minimize future stone risk  Imaging shows the largest of these calculi to be about 5 mm  I therefore have recommended surveillance for the time being  He is to contact office should symptoms developed  He will otherwise undergo KUB, PSA, and repeat urinalysis in 1 year  Review existing urinalyses actually shows no micro hematuria  This therefore can be observed  All questions answered at this time  1  Calculus of kidney  - Urinalysis with microscopic; Future  - XR abdomen 1 view kub; Future    2  Screening for prostate cancer  - PSA Total, Diagnostic; Future      Assessment      Patient Active Problem List   Diagnosis    Calculus of kidney    Screening for prostate cancer       History of Present Illness    Maggie Leo is a 63 y o  male seen today in regards to a history of incidentally discovered bilateral nephrolithiasis  This is noted on a CT scan initially performed for micro hematuria  Patient denies any prior bouts of flank pain or renal colic  Notes fair to good urinary flow with nocturia times 3-4 and occasional incomplete bladder emptying sensation  No history of gross hematuria urinary tract infection  He is a remote smoker having quit greater than 12 years ago  There is a strong family history of nephrolithiasis in patient's sister      Urinary Symptom Assessment        Past Medical History  Past Medical History:   Diagnosis Date    Cancer ([address] )     skin    COPD (chronic obstructive pulmonary disease) (HCC)     GERD (gastroesophageal reflux disease)        Past Social History  Past Surgical History:   Procedure Laterality Date    HERNIA REPAIR         Past Family History  Family History   Problem Relation Age of Onset    Hypertension Mother        Past Social history  Social History     Socioeconomic History    Marital status: /Civil Union     Spouse name: Not Outpatient Physical Therapy Peds Treatment Note      Patient Name: Reed Watkins  : 2001  MRN: [id number]  Today's Date: 12/3/Reed including banda with adaptive swings and Planetary Resources and Lookingglass Cyber Solutionsation on file    Number of children: Not on file    Years of education: Not on file    Highest education level: Not on file   Occupational History    Not on file   Social Needs    Financial resource strain: Not on file    Food insecurity:     Worry: Not on file     Inability: Not on file    Transportation needs:     Medical: Not on file     Non-medical: Not on file   Tobacco Use    Smoking status: Former Smoker     Years: 0 00    Smokeless tobacco: Never Used   Substance and Sexual Activity    Alcohol use: Yes     Comment: occassional    Drug use: No    Sexual activity: Not on file   Lifestyle    Physical activity:     Days per week: Not on file     Minutes per session: Not on file    Stress: Not on file   Relationships    Social connections:     Talks on phone: Not on file     Gets together: Not on file     Attends [employer] service: Not on file     Active member of club or organization: Not on file     Attends meetings of clubs or organizations: Not on file     Relationship status: Not on file    Intimate partner violence:     Fear of current or ex partner: Not on file     Emotionally abused: Not on file     Physically abused: Not on file     Forced sexual activity: Not on file   Other Topics Concern    Not on file   Social History Narrative    Not on file       Current Medications  Current Outpatient Medications   Medication Sig Dispense Refill    budesonide-formoterol (SYMBICORT) 160-4 5 mcg/act inhaler Inhale 2 puffs 2 (two) times a day        esomeprazole (NexIUM) 40 MG capsule Take 40 mg by mouth every morning before breakfast       famotidine (PEPCID) 40 MG tablet       famotidine (PEPCID) 40 MG tablet famotidine 40 mg tablet      fluticasone-umeclidinium-vilanterol (TRELEGY ELLIPTA) 100-62 5-25 MCG/INH inhaler 1 puff od      fluticasone-umeclidinium-vilanterol (TRELEGY ELLIPTA) 100-62 5-25 MCG/INH inhaler Inhale 1 puff      HYDROcodone-acetaminophen (NORCO) 5-325 mg per tablet TAKE 1 TABLET BY MOUTH EVERY DAY AS NEEDED      HYDROcodone-acetaminophen (VICODIN) 5-300 MG per tablet Vicodin 5 mg-300 mg tablet      oxyCODONE-acetaminophen (PERCOCET) 5-325 mg per tablet Every 12 hours      oxyCODONE-acetaminophen (PERCOCET) 5-325 mg per tablet Take 1 tablet by mouth 2 (two) times a day as needed  0     No current facility-administered medications for this visit  Allergies  No Known Allergies    Past Medical History, Social History, Family History, medications and allergies were reviewed  Review of Systems  Review of Systems   Constitutional: Negative  HENT: Negative  Eyes: Negative  Respiratory: Negative  Cardiovascular: Negative  Gastrointestinal: Negative  Endocrine: Negative  Genitourinary: Negative for decreased urine volume, difficulty urinating, hematuria and urgency  Musculoskeletal: Negative  Skin: Negative  Neurological: Negative  Hematological: Negative  Psychiatric/Behavioral: Negative  Vitals  Vitals:    02/14/19 1004   BP: 116/82   BP Location: Left arm   Patient Position: Sitting   Cuff Size: Adult   Pulse: 71   Weight: 93 6 kg (206 lb 6 4 oz)   Height: 6' 2" (1 88 m)         Physical Exam    Physical Exam   Constitutional: He is oriented to person, place, and time  He appears well-developed and well-nourished  HENT:   Head: Normocephalic and atraumatic  Eyes: Pupils are equal, round, and reactive to light  Neck: Normal range of motion  Cardiovascular: Normal rate, regular rhythm and normal heart sounds  Pulmonary/Chest: Effort normal and breath sounds normal  No accessory muscle usage  No respiratory distress  Abdominal: Soft  Normal appearance and bowel sounds are normal  There is no tenderness  Genitourinary: Rectum normal, prostate normal and penis normal  No penile tenderness  Genitourinary Comments: Prostate greater than 40 g  No nodules   Musculoskeletal: Normal range of motion     Neurological: He is alert and oriented to person, place, and time  Skin: Skin is warm, dry and intact  Psychiatric: He has a normal mood and affect  His speech is normal  Cognition and memory are normal    Nursing note and vitals reviewed  Results    Below listed labs, pathology results, and radiology images were personally reviewed:    No results found for: PSA  Lab Results   Component Value Date    GLUCOSE 99 12/27/2015    CALCIUM 8 2 (L) 12/27/2015     12/27/2015    K 3 5 12/27/2015    CO2 32 3 (H) 12/27/2015     12/27/2015    BUN 18 12/27/2015    CREATININE 1 12 12/27/2015     Lab Results   Component Value Date    WBC 9 57 12/27/2015    HGB 16 0 12/27/2015    HCT 45 9 12/27/2015    MCV 91 12/27/2015     12/27/2015       No results found for this or any previous visit (from the past 1 hour(s))  ]    Impression: Bilateral intrarenal calculi  Left upper pole renal cyst             Workstation:[id number]   Result Narrative   CT abdomen pelvis unenhanced    Indication: Hematuria low back pain    Procedure: Helical sections through the abdomen pelvis without contrast material    Comparison: None    Findings: The lung bases are clear  No pleural or pericardial effusion  The liver gallbladder pancreas and spleen are unremarkable  Normal adrenals  Left upper pole 2 2 cm exophytic cyst 10 HU  Tiny left lower  pole lateral capsular calcification  Possible right midpole lateral  intraparenchymal cyst  Right numerous than left intrarenal calculi  The largest  calculus left lower pole measures 5 mm  This is faintly visible on   The  right renal calculi are not visible on all on the   No hydronephrosis or  distal calculi  Urinary bladder partially distended  Prostate seminal vesicles  are not enlarged  Minimal vascular calcification  Retroperitoneum otherwise  unremarkable  Nondilated bowel  No free fluid or air or external hernia  Bony structures generally unremarkable     Other Result Information   Interface, Rad Results In - 12/14/2018  9:13 AM EST  CT abdomen pelvis unenhanced    Indication: Hematuria low back pain    Procedure: Helical sections through the abdomen pelvis without contrast material    Comparison: None    Findings: The lung bases are clear  No pleural or pericardial effusion  The liver gallbladder pancreas and spleen are unremarkable  Normal adrenals  Left upper pole 2 2 cm exophytic cyst 10 HU  Tiny left lower  pole lateral capsular calcification  Possible right midpole lateral  intraparenchymal cyst  Right numerous than left intrarenal calculi  The largest  calculus left lower pole measures 5 mm  This is faintly visible on   The  right renal calculi are not visible on all on the   No hydronephrosis or  distal calculi  Urinary bladder partially distended  Prostate seminal vesicles  are not enlarged  Minimal vascular calcification  Retroperitoneum otherwise  unremarkable  Nondilated bowel  No free fluid or air or external hernia  Bony structures generally unremarkable  IMPRESSION:  Impression: Bilateral intrarenal calculi  Left upper pole renal cyst        Range & Units 11/2/18  9:50 AM    Specific Gravity, Urine 1 003 - 1 030  1 018    pH, Urine 4 5 - 8 0  7    Protein, Urine NEG mg/dL Negative    Glucose, Urine NEG mg/dL Negative    Ketone, Urine NEG mg/dL Negative    Blood, Urine NEG mg/dL 0 03-0  19Abnormal     Leukocytes, Urine NEG /uL Negative    Nitrite, Urine NEG  Negative    Comment   SEE NOTES    Comment: Microscopic to follow     RBC/HPF 0 - 2 /hpf Rare    WBC/HPF 0 - 5 /hpf 0-2    Mucous Threads   Few    Specimen Collected: 11/02/18  9:50 AM Last Resulted: 11/02/18 11:13 PM   Received From: [facility]  Result Received: 02/13/19  6:18 AM 72825 Gait Training                                 15  52791 Neuromuscular Reeducation       20  [id number] There Act                                     25        Carmella Cortez, PT, MSPT  12/3/2019

## 2019-12-04 LAB
ATRIAL RATE: 60 BPM
P AXIS: 53 DEGREES
PR INTERVAL: 168 MS
QRS AXIS: 0 DEGREES
QRSD INTERVAL: 90 MS
QT INTERVAL: 406 MS
QTC INTERVAL: 406 MS
T WAVE AXIS: 52 DEGREES
VENTRICULAR RATE: 60 BPM

## 2019-12-04 PROCEDURE — 93010 ELECTROCARDIOGRAM REPORT: CPT | Performed by: INTERNAL MEDICINE

## 2020-02-13 DIAGNOSIS — Z12.5 SCREENING FOR PROSTATE CANCER: Primary | ICD-10-CM

## 2020-02-13 DIAGNOSIS — N20.0 CALCULUS OF KIDNEY: ICD-10-CM

## 2021-03-10 DIAGNOSIS — Z23 ENCOUNTER FOR IMMUNIZATION: ICD-10-CM

## 2022-03-10 ENCOUNTER — APPOINTMENT (EMERGENCY)
Dept: RADIOLOGY | Facility: HOSPITAL | Age: 62
End: 2022-03-10
Payer: COMMERCIAL

## 2022-03-10 ENCOUNTER — HOSPITAL ENCOUNTER (EMERGENCY)
Facility: HOSPITAL | Age: 62
Discharge: HOME | End: 2022-03-10
Attending: EMERGENCY MEDICINE
Payer: COMMERCIAL

## 2022-03-10 VITALS
RESPIRATION RATE: 18 BRPM | TEMPERATURE: 98 F | DIASTOLIC BLOOD PRESSURE: 99 MMHG | OXYGEN SATURATION: 96 % | HEART RATE: 62 BPM | SYSTOLIC BLOOD PRESSURE: 170 MMHG

## 2022-03-10 DIAGNOSIS — R07.9 CHEST PAIN: Primary | ICD-10-CM

## 2022-03-10 DIAGNOSIS — R03.0 ELEVATED BLOOD PRESSURE READING: ICD-10-CM

## 2022-03-10 LAB
2HR DELTA HS TROPONIN: 0 NG/L
ALBUMIN SERPL BCP-MCNC: 3.8 G/DL (ref 3.5–5)
ALP SERPL-CCNC: 88 U/L (ref 46–116)
ALT SERPL W P-5'-P-CCNC: 29 U/L (ref 12–78)
ANION GAP SERPL CALCULATED.3IONS-SCNC: 6 MMOL/L (ref 4–13)
AST SERPL W P-5'-P-CCNC: 15 U/L (ref 5–45)
BASOPHILS # BLD AUTO: 0.08 THOUSANDS/ΜL (ref 0–0.1)
BASOPHILS NFR BLD AUTO: 1 % (ref 0–1)
BILIRUB SERPL-MCNC: 0.67 MG/DL (ref 0.2–1)
BUN SERPL-MCNC: 14 MG/DL (ref 5–25)
CALCIUM SERPL-MCNC: 9.2 MG/DL (ref 8.3–10.1)
CARDIAC TROPONIN I PNL SERPL HS: 3 NG/L
CARDIAC TROPONIN I PNL SERPL HS: 3 NG/L
CHLORIDE SERPL-SCNC: 106 MMOL/L (ref 100–108)
CO2 SERPL-SCNC: 28 MMOL/L (ref 21–32)
CREAT SERPL-MCNC: 1.06 MG/DL (ref 0.6–1.3)
D DIMER PPP FEU-MCNC: 0.4 UG/ML FEU
EOSINOPHIL # BLD AUTO: 0.13 THOUSAND/ΜL (ref 0–0.61)
EOSINOPHIL NFR BLD AUTO: 2 % (ref 0–6)
ERYTHROCYTE [DISTWIDTH] IN BLOOD BY AUTOMATED COUNT: 12.6 % (ref 11.6–15.1)
GFR SERPL CREATININE-BSD FRML MDRD: 75 ML/MIN/1.73SQ M
GLUCOSE SERPL-MCNC: 99 MG/DL (ref 65–140)
HCT VFR BLD AUTO: 48.4 % (ref 36.5–49.3)
HGB BLD-MCNC: 16.7 G/DL (ref 12–17)
IMM GRANULOCYTES # BLD AUTO: 0.02 THOUSAND/UL (ref 0–0.2)
IMM GRANULOCYTES NFR BLD AUTO: 0 % (ref 0–2)
LYMPHOCYTES # BLD AUTO: 1.39 THOUSANDS/ΜL (ref 0.6–4.47)
LYMPHOCYTES NFR BLD AUTO: 18 % (ref 14–44)
MAGNESIUM SERPL-MCNC: 2.2 MG/DL (ref 1.6–2.6)
MCH RBC QN AUTO: 30.4 PG (ref 26.8–34.3)
MCHC RBC AUTO-ENTMCNC: 34.5 G/DL (ref 31.4–37.4)
MCV RBC AUTO: 88 FL (ref 82–98)
MONOCYTES # BLD AUTO: 0.54 THOUSAND/ΜL (ref 0.17–1.22)
MONOCYTES NFR BLD AUTO: 7 % (ref 4–12)
NEUTROPHILS # BLD AUTO: 5.65 THOUSANDS/ΜL (ref 1.85–7.62)
NEUTS SEG NFR BLD AUTO: 72 % (ref 43–75)
NRBC BLD AUTO-RTO: 0 /100 WBCS
NT-PROBNP SERPL-MCNC: 73 PG/ML
PLATELET # BLD AUTO: 199 THOUSANDS/UL (ref 149–390)
PMV BLD AUTO: 10.5 FL (ref 8.9–12.7)
POTASSIUM SERPL-SCNC: 4.2 MMOL/L (ref 3.5–5.3)
PROT SERPL-MCNC: 7.6 G/DL (ref 6.4–8.2)
RBC # BLD AUTO: 5.49 MILLION/UL (ref 3.88–5.62)
SODIUM SERPL-SCNC: 140 MMOL/L (ref 136–145)
WBC # BLD AUTO: 7.81 THOUSAND/UL (ref 4.31–10.16)

## 2022-03-10 PROCEDURE — 85025 COMPLETE CBC W/AUTO DIFF WBC: CPT | Performed by: PHYSICIAN ASSISTANT

## 2022-03-10 PROCEDURE — 99285 EMERGENCY DEPT VISIT HI MDM: CPT

## 2022-03-10 PROCEDURE — 84484 ASSAY OF TROPONIN QUANT: CPT | Performed by: PHYSICIAN ASSISTANT

## 2022-03-10 PROCEDURE — 80053 COMPREHEN METABOLIC PANEL: CPT | Performed by: PHYSICIAN ASSISTANT

## 2022-03-10 PROCEDURE — 93005 ELECTROCARDIOGRAM TRACING: CPT

## 2022-03-10 PROCEDURE — 83735 ASSAY OF MAGNESIUM: CPT | Performed by: PHYSICIAN ASSISTANT

## 2022-03-10 PROCEDURE — 71045 X-RAY EXAM CHEST 1 VIEW: CPT

## 2022-03-10 PROCEDURE — 99285 EMERGENCY DEPT VISIT HI MDM: CPT | Performed by: PHYSICIAN ASSISTANT

## 2022-03-10 PROCEDURE — 85379 FIBRIN DEGRADATION QUANT: CPT | Performed by: PHYSICIAN ASSISTANT

## 2022-03-10 PROCEDURE — 83880 ASSAY OF NATRIURETIC PEPTIDE: CPT | Performed by: PHYSICIAN ASSISTANT

## 2022-03-10 PROCEDURE — 36415 COLL VENOUS BLD VENIPUNCTURE: CPT | Performed by: PHYSICIAN ASSISTANT

## 2022-03-10 PROCEDURE — 96374 THER/PROPH/DIAG INJ IV PUSH: CPT

## 2022-03-10 RX ORDER — NAPROXEN 500 MG/1
500 TABLET ORAL 2 TIMES DAILY WITH MEALS
Qty: 30 TABLET | Refills: 0 | Status: SHIPPED | OUTPATIENT
Start: 2022-03-10

## 2022-03-10 RX ORDER — KETOROLAC TROMETHAMINE 30 MG/ML
15 INJECTION, SOLUTION INTRAMUSCULAR; INTRAVENOUS ONCE
Status: COMPLETED | OUTPATIENT
Start: 2022-03-10 | End: 2022-03-10

## 2022-03-10 RX ORDER — LIDOCAINE 50 MG/G
1 PATCH TOPICAL DAILY
Qty: 15 PATCH | Refills: 0 | Status: SHIPPED | OUTPATIENT
Start: 2022-03-10

## 2022-03-10 RX ORDER — LIDOCAINE 50 MG/G
1 PATCH TOPICAL ONCE
Status: DISCONTINUED | OUTPATIENT
Start: 2022-03-10 | End: 2022-03-10 | Stop reason: HOSPADM

## 2022-03-10 RX ADMIN — KETOROLAC TROMETHAMINE 15 MG: 30 INJECTION, SOLUTION INTRAMUSCULAR at 09:10

## 2022-03-10 RX ADMIN — LIDOCAINE 5% 1 PATCH: 700 PATCH TOPICAL at 10:59

## 2022-03-10 NOTE — ED PROVIDER NOTES
History  Chief Complaint   Patient presents with    Chest Pain     pt c/o"stabbing" L chest pain, worse with movement  started around 6am  denies hx of similar issue  Patient is a 81-year-old male with a past medical history of COPD, presenting to the ED for evaluation chest pain that started about 2 hours ago  Patient states that he was sitting on his couch when the pain started  He describes it as stabbing pain in the left side of his chest, worsened with movement of his left arm/shoulder or taking a deep breath  The pain is improved if he does not move the left arm  He denies any headaches, dizziness, visual disturbances, nausea, vomiting, SOB, palpitations, abdominal pain or back pain  He denies any orthopnea, paroxysmal nocturnal dyspnea or lower extremity edema  He denies any recent travel/surgery, hemoptysis, cough, history of PE/DVT or calf tenderness/swelling  He denies a history of similar pain in the past   He was a former smoker but says he quit years ago  He denies a personal or family history of ACS  Prior to Admission Medications   Prescriptions Last Dose Informant Patient Reported? Taking? HYDROcodone-acetaminophen (NORCO) 5-325 mg per tablet  Self Yes No   Sig: TAKE 1 TABLET BY MOUTH EVERY DAY AS NEEDED   HYDROcodone-acetaminophen (VICODIN) 5-300 MG per tablet  Self Yes No   Sig: Vicodin 5 mg-300 mg tablet   budesonide-formoterol (SYMBICORT) 160-4 5 mcg/act inhaler  Self Yes No   Sig: Inhale 2 puffs 2 (two) times a day     esomeprazole (NexIUM) 40 MG capsule  Self Yes No   Sig: Take 40 mg by mouth every morning before breakfast    famotidine (PEPCID) 40 MG tablet  Self Yes No   famotidine (PEPCID) 40 MG tablet  Self Yes No   Sig: famotidine 40 mg tablet   fluticasone-umeclidinium-vilanterol (TRELEGY ELLIPTA) 100-62 5-25 MCG/INH inhaler  Self Yes No   Si puff od   fluticasone-umeclidinium-vilanterol (TRELEGY ELLIPTA) 100-62 5-25 MCG/INH inhaler  Self Yes No   Sig: Inhale 1 puff   oxyCODONE-acetaminophen (PERCOCET) 5-325 mg per tablet  Self Yes No   Sig: Every 12 hours   oxyCODONE-acetaminophen (PERCOCET) 5-325 mg per tablet  Self Yes No   Sig: Take 1 tablet by mouth 2 (two) times a day as needed      Facility-Administered Medications: None       Past Medical History:   Diagnosis Date    Cancer ([facility] [address] )     skin    COPD (chronic obstructive pulmonary disease) (HCC)     GERD (gastroesophageal reflux disease)        Past Surgical History:   Procedure Laterality Date    HERNIA REPAIR         Family History   Problem Relation Age of Onset    Hypertension Mother      I have reviewed and agree with the history as documented  E-Cigarette/Vaping     E-Cigarette/Vaping Substances     Social History     Tobacco Use    Smoking status: Former Smoker     Years: 0 00    Smokeless tobacco: Never Used   Substance Use Topics    Alcohol use: Yes     Comment: occassional    Drug use: No       Review of Systems   Constitutional: Negative for chills, diaphoresis, fatigue and fever  HENT: Negative for congestion, ear pain, mouth sores, rhinorrhea, sinus pain, sore throat and trouble swallowing  Eyes: Negative for photophobia and visual disturbance  Respiratory: Negative for cough, chest tightness, shortness of breath and wheezing  Cardiovascular: Positive for chest pain  Negative for palpitations and leg swelling  Gastrointestinal: Negative for abdominal pain, blood in stool, constipation, diarrhea, nausea and vomiting  Genitourinary: Negative for dysuria, flank pain, frequency, hematuria and urgency  Musculoskeletal: Negative for arthralgias, back pain, gait problem, joint swelling, myalgias and neck pain  Skin: Negative for color change, pallor and rash  Neurological: Negative for dizziness, syncope, speech difficulty, weakness, light-headedness, numbness and headaches  Psychiatric/Behavioral: Negative for confusion and sleep disturbance     All other systems reviewed and are negative  Physical Exam  Physical Exam  Vitals and nursing note reviewed  Constitutional:       General: He is awake  He is not in acute distress  Appearance: Normal appearance  He is well-developed  He is not ill-appearing or diaphoretic  HENT:      Head: Normocephalic and atraumatic  Right Ear: External ear normal       Left Ear: External ear normal       Nose: Nose normal       Mouth/Throat:      Lips: Pink  Mouth: Mucous membranes are moist    Eyes:      General: Lids are normal  No scleral icterus  Conjunctiva/sclera: Conjunctivae normal       Pupils: Pupils are equal, round, and reactive to light  Cardiovascular:      Rate and Rhythm: Normal rate and regular rhythm  Pulses: Normal pulses  Radial pulses are 2+ on the right side and 2+ on the left side  Heart sounds: Normal heart sounds, S1 normal and S2 normal    Pulmonary:      Effort: Pulmonary effort is normal  No accessory muscle usage  Breath sounds: Normal breath sounds  No stridor  No decreased breath sounds, wheezing, rhonchi or rales  Comments: Lungs are clear and equal to auscultation bilaterally  No wheezing, rhonchi or rales  SpO2 96-98%  Abdominal:      General: Abdomen is flat  Bowel sounds are normal  There is no distension  Palpations: Abdomen is soft  Tenderness: There is no abdominal tenderness  There is no right CVA tenderness, left CVA tenderness, guarding or rebound  Musculoskeletal:      Cervical back: Full passive range of motion without pain, normal range of motion and neck supple  No signs of trauma  No pain with movement  Normal range of motion  Right lower leg: No edema  Left lower leg: No edema  Lymphadenopathy:      Cervical: No cervical adenopathy  Skin:     General: Skin is warm and dry  Capillary Refill: Capillary refill takes less than 2 seconds  Coloration: Skin is not cyanotic, jaundiced or pale     Neurological:      Mental Status: He is alert and oriented to person, place, and time  GCS: GCS eye subscore is 4  GCS verbal subscore is 5  GCS motor subscore is 6  Cranial Nerves: No dysarthria or facial asymmetry  Gait: Gait normal    Psychiatric:         Attention and Perception: Attention normal          Mood and Affect: Mood normal          Speech: Speech normal          Behavior: Behavior normal  Behavior is cooperative           Vital Signs  ED Triage Vitals   Temperature Pulse Respirations Blood Pressure SpO2   03/10/22 0827 03/10/22 0827 03/10/22 0827 03/10/22 0827 03/10/22 0827   98 °F (36 7 °C) 56 18 (!) 197/91 98 %      Temp src Heart Rate Source Patient Position - Orthostatic VS BP Location FiO2 (%)   -- 03/10/22 0827 03/10/22 0827 03/10/22 0827 --    Monitor Sitting Right arm       Pain Score       03/10/22 0910       4           Vitals:    03/10/22 0912 03/10/22 1115 03/10/22 1130 03/10/22 1230   BP: (!) 171/86 (!) 177/81 170/89 170/99   Pulse: 65 66 62 62   Patient Position - Orthostatic VS: Sitting Sitting Sitting Sitting         Visual Acuity      ED Medications  Medications   ketorolac (TORADOL) injection 15 mg (15 mg Intravenous Given 3/10/22 0910)       Diagnostic Studies  Results Reviewed     Procedure Component Value Units Date/Time    HS Troponin I 2hr [[id number]]  (Normal) Collected: 03/10/22 1105    Lab Status: Final result Specimen: Blood from Arm, Left Updated: 03/10/22 1211     hs TnI 2hr 3 ng/L      Delta 2hr hsTnI 0 ng/L     NT-BNP PRO [[id number]]  (Normal) Collected: 03/10/22 0905    Lab Status: Final result Specimen: Blood from Arm, Left Updated: 03/10/22 0959     NT-proBNP 73 pg/mL     Magnesium [[id number]]  (Normal) Collected: 03/10/22 0905    Lab Status: Final result Specimen: Blood from Arm, Left Updated: 03/10/22 0959     Magnesium 2 2 mg/dL     Comprehensive metabolic panel [[id number]] Collected: 03/10/22 0905    Lab Status: Final result Specimen: Blood from Arm, Left Updated: 03/10/22 0957     Sodium 140 mmol/L      Potassium 4 2 mmol/L      Chloride 106 mmol/L      CO2 28 mmol/L      ANION GAP 6 mmol/L      BUN 14 mg/dL      Creatinine 1 06 mg/dL      Glucose 99 mg/dL      Calcium 9 2 mg/dL      AST 15 U/L      ALT 29 U/L      Alkaline Phosphatase 88 U/L      Total Protein 7 6 g/dL      Albumin 3 8 g/dL      Total Bilirubin 0 67 mg/dL      eGFR 75 ml/min/1 73sq m     Narrative:      National Kidney Disease Foundation guidelines for Chronic Kidney Disease (CKD):     Stage 1 with normal or high GFR (GFR > 90 mL/min/1 73 square meters)    Stage 2 Mild CKD (GFR = 60-89 mL/min/1 73 square meters)    Stage 3A Moderate CKD (GFR = 45-59 mL/min/1 73 square meters)    Stage 3B Moderate CKD (GFR = 30-44 mL/min/1 73 square meters)    Stage 4 Severe CKD (GFR = 15-29 mL/min/1 73 square meters)    Stage 5 End Stage CKD (GFR <15 mL/min/1 73 square meters)  Note: GFR calculation is accurate only with a steady state creatinine    HS Troponin 0hr (reflex protocol) [[id number]]  (Normal) Collected: 03/10/22 0905    Lab Status: Final result Specimen: Blood from Arm, Left Updated: 03/10/22 0942     hs TnI 0hr 3 ng/L     D-Dimer [[id number]]  (Normal) Collected: 03/10/22 0905    Lab Status: Final result Specimen: Blood from Arm, Left Updated: 03/10/22 0932     D-Dimer, Quant 0 40 ug/ml FEU     Narrative: In the evaluation for possible pulmonary embolism, in the appropriate (Well's Score of 4 or less) patient, the age adjusted d-dimer cutoff for this patient can be calculated as:    Age x 0 01 (in ug/mL) for Age-adjusted D-dimer exclusion threshold for a patient over 50 years      CBC and differential [[id number]] Collected: 03/10/22 0905    Lab Status: Final result Specimen: Blood from Arm, Left Updated: 03/10/22 0914     WBC 7 81 Thousand/uL      RBC 5 49 Million/uL      Hemoglobin 16 7 g/dL      Hematocrit 48 4 %      MCV 88 fL      MCH 30 4 pg      MCHC 34 5 g/dL      RDW 12 6 %      MPV 10 5 fL Platelets 231 Thousands/uL      nRBC 0 /100 WBCs      Neutrophils Relative 72 %      Immat GRANS % 0 %      Lymphocytes Relative 18 %      Monocytes Relative 7 %      Eosinophils Relative 2 %      Basophils Relative 1 %      Neutrophils Absolute 5 65 Thousands/µL      Immature Grans Absolute 0 02 Thousand/uL      Lymphocytes Absolute 1 39 Thousands/µL      Monocytes Absolute 0 54 Thousand/µL      Eosinophils Absolute 0 13 Thousand/µL      Basophils Absolute 0 08 Thousands/µL                  XR chest 1 view portable   Final Result by Oliverio Stevens MD (03/10 1012)      No acute cardiopulmonary disease  Workstation performed: [id number]                    Procedures  ECG 12 Lead Documentation Only    Date/Time: 3/10/2022 10:44 AM  Performed by: Shan Anaya PA-C  Authorized by: Shan Anaya PA-C     Indications / Diagnosis:  Chest pain  ECG reviewed by me, the ED Provider: yes    Patient location:  ED  Previous ECG:     Previous ECG:  Compared to current    Comparison ECG info:  12/2/19    Similarity:  No change    Comparison to cardiac monitor: Yes    Interpretation:     Interpretation: normal    Rate:     ECG rate:  59    ECG rate assessment: bradycardic    Rhythm:     Rhythm: sinus bradycardia    Ectopy:     Ectopy: none    QRS:     QRS axis:  Normal    QRS intervals:  Normal  Conduction:     Conduction: normal    ST segments:     ST segments:  Normal  T waves:     T waves: non-specific and inverted      Inverted:  AVR and aVL  Comments:      No STEMI  QT/QTc 412/407               ED Course             HEART Risk Score      Most Recent Value   Heart Score Risk Calculator    History 0 Filed at: 03/10/2022 1143   ECG 0 Filed at: 03/10/2022 1143   Age 1 Filed at: 03/10/2022 1143   Risk Factors 1 Filed at: 03/10/2022 1143   Troponin 0 Filed at: 03/10/2022 1143   HEART Score 2 Filed at: 03/10/2022 1143                                      MDM  Number of Diagnoses or Management Options  Chest pain  Elevated blood pressure reading  Diagnosis management comments: Patient is a 61-year-old male with a past medical history of COPD, presenting to the ED for evaluation chest pain that started about 2 hours ago  EKG normal sinus rhythm  Troponin negative x2  D-dimer negative  Chest x-ray shows no evidence of acute cardiopulmonary disease  Heart score 2  Patient states that he has no pain at this time the pain resolved with lidocaine patch and Toradol  Patient advised to follow-up with PCP for elevated blood pressures  The management plan was discussed in detail with the patient at bedside and all questions were answered  Prior to discharge, verbal and written instructions provided  Strict ED return precautions discussed in detail  The patient verbalized understanding of our discussion and plan of care, and agrees to return to the Emergency Department for concerns and progression of illness  Amount and/or Complexity of Data Reviewed  Clinical lab tests: ordered and reviewed  Tests in the radiology section of CPT®: ordered and reviewed    Patient Progress  Patient progress: stable      Disposition  Final diagnoses:   Chest pain   Elevated blood pressure reading     Time reflects when diagnosis was documented in both MDM as applicable and the Disposition within this note     Time User Action Codes Description Comment    3/10/2022 11:43 AM Emeterio Bryant Add [R07 9] Chest pain     3/10/2022 12:43 PM Mary Martins Add [R03 0] Elevated blood pressure reading       ED Disposition     ED Disposition Condition Date/Time Comment    Discharge Stable Thu Mar 10, 2022 12:43 PM Monica Petit discharge to home/self care              Follow-up Information     Follow up With Specialties Details Why Contact Info Additional Information    Nicolas Zavala MD Internal Medicine Schedule an appointment as soon as possible for a visit   [address] [place] [address] Catia Valero [phone]  Emergency Department Emergency Medicine  If symptoms worsen [address] [facility] [address] [facility] Emergency Department, [address], [place], [place], [address]          Discharge Medication List as of 3/10/2022 12:43 PM      START taking these medications    Details   lidocaine (Lidoderm) 5 % Apply 1 patch topically daily Remove & Discard patch within 12 hours or as directed by MD, Starting Thu 3/10/2022, Normal      naproxen (Naprosyn) 500 mg tablet Take 1 tablet (500 mg total) by mouth 2 (two) times a day with meals, Starting Thu 3/10/2022, Normal         CONTINUE these medications which have NOT CHANGED    Details   budesonide-formoterol (SYMBICORT) 160-4 5 mcg/act inhaler Inhale 2 puffs 2 (two) times a day , Historical Med      esomeprazole (NexIUM) 40 MG capsule Take 40 mg by mouth every morning before breakfast , Historical Med      !! famotidine (PEPCID) 40 MG tablet Starting Tue 2/12/2019, Historical Med      !! famotidine (PEPCID) 40 MG tablet famotidine 40 mg tablet, Historical Med      !! fluticasone-umeclidinium-vilanterol (TRELEGY ELLIPTA) 100-62 5-25 MCG/INH inhaler 1 puff od, Historical Med      !! fluticasone-umeclidinium-vilanterol (TRELEGY ELLIPTA) 100-62 5-25 MCG/INH inhaler Inhale 1 puff, Starting Thu 8/23/2018, Historical Med      HYDROcodone-acetaminophen (NORCO) 5-325 mg per tablet TAKE 1 TABLET BY MOUTH EVERY DAY AS NEEDED, Historical Med      HYDROcodone-acetaminophen (VICODIN) 5-300 MG per tablet Vicodin 5 mg-300 mg tablet, Historical Med      !! oxyCODONE-acetaminophen (PERCOCET) 5-325 mg per tablet Every 12 hours, Historical Med      !! oxyCODONE-acetaminophen (PERCOCET) 5-325 mg per tablet Take 1 tablet by mouth 2 (two) times a day as needed, Starting Mon 2/4/2019, Historical Med       !! - Potential duplicate medications found  Please discuss with provider            No discharge procedures on file     PDMP Review     None          ED Provider  Electronically Signed by           Zachary Rossi PA-C  03/10/22 2915

## 2022-03-10 NOTE — Clinical Note
Odilon Camejo was seen and treated in our emergency department on 3/10/2022  Diagnosis:     Tami Molina  may return to work on return date  He may return on this date: 03/11/2022         If you have any questions or concerns, please don't hesitate to call        Zachary Rossi PA-C    ______________________________           _______________          _______________  Hospital Representative                              Date                                Time

## 2022-03-12 LAB
ATRIAL RATE: 59 BPM
P AXIS: 46 DEGREES
PR INTERVAL: 156 MS
QRS AXIS: 37 DEGREES
QRSD INTERVAL: 84 MS
QT INTERVAL: 412 MS
QTC INTERVAL: 407 MS
T WAVE AXIS: 68 DEGREES
VENTRICULAR RATE: 59 BPM

## 2022-03-12 PROCEDURE — 93010 ELECTROCARDIOGRAM REPORT: CPT | Performed by: INTERNAL MEDICINE

## 2022-06-13 ENCOUNTER — HOSPITAL ENCOUNTER (EMERGENCY)
Facility: HOSPITAL | Age: 62
End: 2022-06-14
Attending: EMERGENCY MEDICINE | Admitting: EMERGENCY MEDICINE
Payer: COMMERCIAL

## 2022-06-13 DIAGNOSIS — R10.9 RIGHT FLANK PAIN: Primary | ICD-10-CM

## 2022-06-13 DIAGNOSIS — N28.9 ACUTE RENAL INSUFFICIENCY: ICD-10-CM

## 2022-06-13 PROCEDURE — 96361 HYDRATE IV INFUSION ADD-ON: CPT

## 2022-06-13 PROCEDURE — 99285 EMERGENCY DEPT VISIT HI MDM: CPT | Performed by: STUDENT IN AN ORGANIZED HEALTH CARE EDUCATION/TRAINING PROGRAM

## 2022-06-13 PROCEDURE — 83735 ASSAY OF MAGNESIUM: CPT | Performed by: STUDENT IN AN ORGANIZED HEALTH CARE EDUCATION/TRAINING PROGRAM

## 2022-06-13 PROCEDURE — 85025 COMPLETE CBC W/AUTO DIFF WBC: CPT | Performed by: STUDENT IN AN ORGANIZED HEALTH CARE EDUCATION/TRAINING PROGRAM

## 2022-06-13 PROCEDURE — 96374 THER/PROPH/DIAG INJ IV PUSH: CPT

## 2022-06-13 PROCEDURE — 80053 COMPREHEN METABOLIC PANEL: CPT | Performed by: STUDENT IN AN ORGANIZED HEALTH CARE EDUCATION/TRAINING PROGRAM

## 2022-06-13 PROCEDURE — 99285 EMERGENCY DEPT VISIT HI MDM: CPT

## 2022-06-13 PROCEDURE — 36415 COLL VENOUS BLD VENIPUNCTURE: CPT | Performed by: STUDENT IN AN ORGANIZED HEALTH CARE EDUCATION/TRAINING PROGRAM

## 2022-06-13 RX ORDER — KETOROLAC TROMETHAMINE 30 MG/ML
15 INJECTION, SOLUTION INTRAMUSCULAR; INTRAVENOUS ONCE
Status: COMPLETED | OUTPATIENT
Start: 2022-06-13 | End: 2022-06-13

## 2022-06-13 RX ADMIN — SODIUM CHLORIDE 1000 ML: 0.9 INJECTION, SOLUTION INTRAVENOUS at 23:52

## 2022-06-13 RX ADMIN — KETOROLAC TROMETHAMINE 15 MG: 30 INJECTION, SOLUTION INTRAMUSCULAR at 23:53

## 2022-06-14 ENCOUNTER — HOSPITAL ENCOUNTER (EMERGENCY)
Facility: HOSPITAL | Age: 62
Discharge: HOME | End: 2022-06-14
Attending: EMERGENCY MEDICINE
Payer: COMMERCIAL

## 2022-06-14 ENCOUNTER — APPOINTMENT (EMERGENCY)
Dept: RADIOLOGY | Facility: HOSPITAL | Age: 62
End: 2022-06-14
Payer: COMMERCIAL

## 2022-06-14 VITALS
RESPIRATION RATE: 18 BRPM | TEMPERATURE: 97.6 F | OXYGEN SATURATION: 97 % | BODY MASS INDEX: 26.95 KG/M2 | DIASTOLIC BLOOD PRESSURE: 79 MMHG | HEIGHT: 74 IN | HEART RATE: 72 BPM | WEIGHT: 210 LBS | SYSTOLIC BLOOD PRESSURE: 152 MMHG

## 2022-06-14 VITALS
TEMPERATURE: 98 F | RESPIRATION RATE: 18 BRPM | OXYGEN SATURATION: 95 % | SYSTOLIC BLOOD PRESSURE: 122 MMHG | DIASTOLIC BLOOD PRESSURE: 62 MMHG | HEART RATE: 68 BPM

## 2022-06-14 DIAGNOSIS — N28.1 RENAL CYST: ICD-10-CM

## 2022-06-14 DIAGNOSIS — N20.1 URETERAL STONE: Primary | ICD-10-CM

## 2022-06-14 LAB
ALBUMIN SERPL BCP-MCNC: 4.3 G/DL (ref 3.5–5)
ALP SERPL-CCNC: 58 U/L (ref 34–104)
ALT SERPL W P-5'-P-CCNC: 20 U/L (ref 7–52)
ANION GAP SERPL CALCULATED.3IONS-SCNC: 9 MMOL/L (ref 4–13)
AST SERPL W P-5'-P-CCNC: 18 U/L (ref 13–39)
ATRIAL RATE: 55 BPM
BACTERIA UR QL AUTO: ABNORMAL /HPF
BASOPHILS # BLD AUTO: 0.07 THOUSANDS/ΜL (ref 0–0.1)
BASOPHILS NFR BLD AUTO: 1 % (ref 0–1)
BILIRUB SERPL-MCNC: 0.71 MG/DL (ref 0.2–1)
BILIRUB UR QL STRIP: NEGATIVE
BUN SERPL-MCNC: 40 MG/DL (ref 5–25)
CALCIUM SERPL-MCNC: 9.5 MG/DL (ref 8.4–10.2)
CHLORIDE SERPL-SCNC: 104 MMOL/L (ref 96–108)
CLARITY UR: CLEAR
CO2 SERPL-SCNC: 27 MMOL/L (ref 21–32)
COLOR UR: YELLOW
CREAT SERPL-MCNC: 1.46 MG/DL (ref 0.6–1.3)
EOSINOPHIL # BLD AUTO: 0.11 THOUSAND/ΜL (ref 0–0.61)
EOSINOPHIL NFR BLD AUTO: 2 % (ref 0–6)
ERYTHROCYTE [DISTWIDTH] IN BLOOD BY AUTOMATED COUNT: 12.8 % (ref 11.6–15.1)
GFR SERPL CREATININE-BSD FRML MDRD: 51 ML/MIN/1.73SQ M
GLUCOSE SERPL-MCNC: 94 MG/DL (ref 65–140)
GLUCOSE UR STRIP-MCNC: NEGATIVE MG/DL
HCT VFR BLD AUTO: 45.5 % (ref 36.5–49.3)
HGB BLD-MCNC: 15.4 G/DL (ref 12–17)
HGB UR QL STRIP.AUTO: ABNORMAL
HYALINE CASTS #/AREA URNS LPF: ABNORMAL /LPF
IMM GRANULOCYTES # BLD AUTO: 0.01 THOUSAND/UL (ref 0–0.2)
IMM GRANULOCYTES NFR BLD AUTO: 0 % (ref 0–2)
KETONES UR STRIP-MCNC: NEGATIVE MG/DL
LEUKOCYTE ESTERASE UR QL STRIP: NEGATIVE
LYMPHOCYTES # BLD AUTO: 2.04 THOUSANDS/ΜL (ref 0.6–4.47)
LYMPHOCYTES NFR BLD AUTO: 29 % (ref 14–44)
MAGNESIUM SERPL-MCNC: 2.2 MG/DL (ref 1.9–2.7)
MCH RBC QN AUTO: 30.4 PG (ref 26.8–34.3)
MCHC RBC AUTO-ENTMCNC: 33.8 G/DL (ref 31.4–37.4)
MCV RBC AUTO: 90 FL (ref 82–98)
MONOCYTES # BLD AUTO: 0.57 THOUSAND/ΜL (ref 0.17–1.22)
MONOCYTES NFR BLD AUTO: 8 % (ref 4–12)
MUCOUS THREADS UR QL AUTO: ABNORMAL
NEUTROPHILS # BLD AUTO: 4.32 THOUSANDS/ΜL (ref 1.85–7.62)
NEUTS SEG NFR BLD AUTO: 60 % (ref 43–75)
NITRITE UR QL STRIP: NEGATIVE
NON-SQ EPI CELLS URNS QL MICRO: ABNORMAL /HPF
NRBC BLD AUTO-RTO: 0 /100 WBCS
P AXIS: 57 DEGREES
PH UR STRIP.AUTO: 5.5 [PH]
PLATELET # BLD AUTO: 186 THOUSANDS/UL (ref 149–390)
PMV BLD AUTO: 11.2 FL (ref 8.9–12.7)
POTASSIUM SERPL-SCNC: 3.2 MMOL/L (ref 3.5–5.3)
PR INTERVAL: 166 MS
PROT SERPL-MCNC: 7.1 G/DL (ref 6.4–8.4)
PROT UR STRIP-MCNC: NEGATIVE MG/DL
QRS AXIS: 40 DEGREES
QRSD INTERVAL: 90 MS
QT INTERVAL: 422 MS
QTC INTERVAL: 403 MS
RBC # BLD AUTO: 5.07 MILLION/UL (ref 3.88–5.62)
RBC #/AREA URNS AUTO: ABNORMAL /HPF
SODIUM SERPL-SCNC: 140 MMOL/L (ref 135–147)
SP GR UR STRIP.AUTO: 1.02 (ref 1–1.03)
T WAVE AXIS: 54 DEGREES
UROBILINOGEN UR QL STRIP.AUTO: 0.2 E.U./DL
VENTRICULAR RATE: 55 BPM
WBC # BLD AUTO: 7.12 THOUSAND/UL (ref 4.31–10.16)
WBC #/AREA URNS AUTO: ABNORMAL /HPF

## 2022-06-14 PROCEDURE — 93010 ELECTROCARDIOGRAM REPORT: CPT | Performed by: INTERNAL MEDICINE

## 2022-06-14 PROCEDURE — 96361 HYDRATE IV INFUSION ADD-ON: CPT

## 2022-06-14 PROCEDURE — 96375 TX/PRO/DX INJ NEW DRUG ADDON: CPT

## 2022-06-14 PROCEDURE — 93005 ELECTROCARDIOGRAM TRACING: CPT

## 2022-06-14 PROCEDURE — 99284 EMERGENCY DEPT VISIT MOD MDM: CPT

## 2022-06-14 PROCEDURE — 99284 EMERGENCY DEPT VISIT MOD MDM: CPT | Performed by: EMERGENCY MEDICINE

## 2022-06-14 PROCEDURE — G1004 CDSM NDSC: HCPCS

## 2022-06-14 PROCEDURE — 81001 URINALYSIS AUTO W/SCOPE: CPT | Performed by: EMERGENCY MEDICINE

## 2022-06-14 PROCEDURE — 74176 CT ABD & PELVIS W/O CONTRAST: CPT

## 2022-06-14 RX ORDER — POTASSIUM CHLORIDE 20 MEQ/1
40 TABLET, EXTENDED RELEASE ORAL ONCE
Status: COMPLETED | OUTPATIENT
Start: 2022-06-14 | End: 2022-06-14

## 2022-06-14 RX ADMIN — MORPHINE SULFATE 2 MG: 2 INJECTION, SOLUTION INTRAMUSCULAR; INTRAVENOUS at 00:34

## 2022-06-14 RX ADMIN — POTASSIUM CHLORIDE 40 MEQ: 1500 TABLET, EXTENDED RELEASE ORAL at 00:34

## 2022-06-14 NOTE — ED PROVIDER NOTES
History  Chief Complaint   Patient presents with    Flank Pain     Right flank pain x 1 hour  Hx kidney stones     PMHx: COPD, GERD, skin cancer  PSH: hernia repair    [place] is a 65year old male who presents to the ED with non-radiating, sharp/stabbing, sudden onset right flank that began began about 30 minutes PTA, denies alleviating/aggravating factors, denies taking any medications PTA for symptom control  Patient notes h/o kidney stones, states PCP noted hematuria so was sent for CT where stones were found, patient states have never caused him pain and required no intervention  Patient notes mild irritation to urethral meatus since he urinated about 1 hour PTA, denies dysuria, hematuria, urgency, or frequency  Patient notes associated nausea at symptom onset which has since resolved, denies any episodes of vomiting  Patient denies any other associated symptoms including fever/chills, lightheadedness, syncope, SOB, CP, back pain, abdominal pain, diarrhea, or any other concerns at this time  Per chart review: seen by urology at [facility] 11/2021, prescribed tamulosin  11/2021 CT abd pel-  Kidneys/Ureters: Symmetric nephrograms  No hydronephrosis  Cyst upper pole left kidney measures 2 4 cm  Suspected proteinaceous/hemorrhagic cyst at the medial right upper pole measuring 15 x 9 mm, although indeterminate  Additional subcentimeter cortical hypodensities bilaterally are likely cysts  Approximately 4 calculi are identified in the right kidney largest is at the upper pole measuring 6 mm  On the left, the largest stone is at the lower pole measuring 8 mm with approximately 7 smaller intrarenal calculi  History provided by:  Patient and medical records   used: No        Prior to Admission Medications   Prescriptions Last Dose Informant Patient Reported? Taking?    HYDROcodone-acetaminophen (NORCO) 5-325 mg per tablet  Self Yes No   Sig: TAKE 1 TABLET BY MOUTH EVERY DAY AS NEEDED HYDROcodone-acetaminophen (VICODIN) 5-300 MG per tablet  Self Yes No   Sig: Vicodin 5 mg-300 mg tablet   budesonide-formoterol (SYMBICORT) 160-4 5 mcg/act inhaler  Self Yes No   Sig: Inhale 2 puffs 2 (two) times a day  esomeprazole (NexIUM) 40 MG capsule  Self Yes No   Sig: Take 40 mg by mouth every morning before breakfast    famotidine (PEPCID) 40 MG tablet  Self Yes No   famotidine (PEPCID) 40 MG tablet  Self Yes No   Sig: famotidine 40 mg tablet   fluticasone-umeclidinium-vilanterol (TRELEGY ELLIPTA) 100-62 5-25 MCG/INH inhaler  Self Yes No   Si puff od   fluticasone-umeclidinium-vilanterol (TRELEGY ELLIPTA) 100-62 5-25 MCG/INH inhaler  Self Yes No   Sig: Inhale 1 puff   lidocaine (Lidoderm) 5 %   No No   Sig: Apply 1 patch topically daily Remove & Discard patch within 12 hours or as directed by MD   naproxen (Naprosyn) 500 mg tablet   No No   Sig: Take 1 tablet (500 mg total) by mouth 2 (two) times a day with meals   oxyCODONE-acetaminophen (PERCOCET) 5-325 mg per tablet  Self Yes No   Sig: Every 12 hours   oxyCODONE-acetaminophen (PERCOCET) 5-325 mg per tablet  Self Yes No   Sig: Take 1 tablet by mouth 2 (two) times a day as needed      Facility-Administered Medications: None       Past Medical History:   Diagnosis Date    Cancer ([address] )     skin    [facility]     GERD (gastroesophageal reflux disease)        Past Surgical History:   Procedure Laterality Date    HERNIA REPAIR         Family History   Problem Relation Age of Onset    Hypertension Mother      I have reviewed and agree with the history as documented  E-Cigarette/Vaping     E-Cigarette/Vaping Substances     Social History     Tobacco Use    Smoking status: Former Smoker     Years: 0 00    Smokeless tobacco: Never Used   Substance Use Topics    Alcohol use: Yes     Comment: occassional    Drug use: No       Review of Systems   Constitutional: Negative for chills and fever     HENT: Negative for congestion, ear pain, sore throat and trouble swallowing  Eyes: Negative for pain and visual disturbance  Respiratory: Negative for cough and shortness of breath  Cardiovascular: Negative for chest pain and palpitations  Gastrointestinal: Positive for nausea (see HPI)  Negative for abdominal pain, diarrhea and vomiting  Genitourinary: Positive for flank pain  Negative for dysuria, frequency, hematuria, penile discharge and urgency  Musculoskeletal: Negative for arthralgias, back pain, gait problem, joint swelling, myalgias and neck pain  Skin: Negative for color change and rash  Neurological: Negative for syncope, light-headedness and headaches  Physical Exam  Physical Exam  Vitals and nursing note reviewed  Constitutional:       General: He is in acute distress (2/2 pain)  Appearance: Normal appearance  He is well-developed  He is not ill-appearing  HENT:      Head: Normocephalic and atraumatic  Right Ear: External ear normal       Left Ear: External ear normal       Nose: Nose normal  No congestion or rhinorrhea  Mouth/Throat:      Mouth: Mucous membranes are moist    Eyes:      General:         Right eye: No discharge  Left eye: No discharge  Conjunctiva/sclera: Conjunctivae normal    Cardiovascular:      Rate and Rhythm: Normal rate and regular rhythm  Heart sounds: No murmur heard  No friction rub  No gallop  Pulmonary:      Effort: Pulmonary effort is normal  No respiratory distress  Breath sounds: Normal breath sounds  No stridor  No wheezing, rhonchi or rales  Abdominal:      General: Abdomen is flat  Bowel sounds are normal  There is no distension  Palpations: Abdomen is soft  Tenderness: There is no abdominal tenderness  There is no right CVA tenderness, left CVA tenderness, guarding or rebound  Genitourinary:     Comments: Deferred  Musculoskeletal:         General: No deformity or signs of injury        Cervical back: Normal range of motion and neck supple  Right lower leg: No edema  Left lower leg: No edema  Skin:     General: Skin is warm and dry  Capillary Refill: Capillary refill takes less than 2 seconds  Findings: No bruising, erythema or rash  Neurological:      General: No focal deficit present  Mental Status: He is alert and oriented to person, place, and time     Psychiatric:         Mood and Affect: Mood normal          Vital Signs  ED Triage Vitals [06/13/22 2258]   Temperature Pulse Respirations Blood Pressure SpO2   (!) 97 4 °F (36 3 °C) 69 20 158/86 96 %      Temp Source Heart Rate Source Patient Position - Orthostatic VS BP Location FiO2 (%)   Oral -- -- -- --      Pain Score       7           Vitals:    06/13/22 2258   BP: 158/86   Pulse: 69         Visual Acuity      ED Medications  Medications   sodium chloride 0 9 % bolus 1,000 mL (1,000 mL Intravenous New Bag 6/13/22 2352)   ketorolac (TORADOL) injection 15 mg (15 mg Intravenous Given 6/13/22 2353)   potassium chloride (K-DUR,KLOR-CON) CR tablet 40 mEq (40 mEq Oral Given 6/14/22 0034)   morphine injection 2 mg (2 mg Intravenous Given 6/14/22 0034)       Diagnostic Studies  Results Reviewed     Procedure Component Value Units Date/Time    Comprehensive metabolic panel [[id number]]  (Abnormal) Collected: 06/13/22 2352    Lab Status: Final result Specimen: Blood from Arm, Left Updated: 06/14/22 0019     Sodium 140 mmol/L      Potassium 3 2 mmol/L      Chloride 104 mmol/L      CO2 27 mmol/L      ANION GAP 9 mmol/L      BUN 40 mg/dL      Creatinine 1 46 mg/dL      Glucose 94 mg/dL      Calcium 9 5 mg/dL      AST 18 U/L      ALT 20 U/L      Alkaline Phosphatase 58 U/L      Total Protein 7 1 g/dL      Albumin 4 3 g/dL      Total Bilirubin 0 71 mg/dL      eGFR 51 ml/min/1 73sq m     Narrative:      [place] guidelines for Chronic Kidney Disease (CKD):     Stage 1 with normal or high GFR (GFR > 90 mL/min/1 73 square meters)    Stage 2 Mild CKD (GFR = 60-89 mL/min/1 73 square meters)    Stage 3A Moderate CKD (GFR = 45-59 mL/min/1 73 square meters)    Stage 3B Moderate CKD (GFR = 30-44 mL/min/1 73 square meters)    Stage 4 Severe CKD (GFR = 15-29 mL/min/1 73 square meters)    Stage 5 End Stage CKD (GFR <15 mL/min/1 73 square meters)  Note: GFR calculation is accurate only with a steady state creatinine    Magnesium [[id number]]  (Normal) Collected: 06/13/22 2352    Lab Status: Final result Specimen: Blood from Arm, Left Updated: 06/14/22 0019     Magnesium 2 2 mg/dL     CBC and differential [[id number]] Collected: 06/13/22 2352    Lab Status: Final result Specimen: Blood from Arm, Left Updated: 06/14/22 0006     WBC 7 12 Thousand/uL      RBC 5 07 Million/uL      Hemoglobin 15 4 g/dL      Hematocrit 45 5 %      MCV 90 fL      MCH 30 4 pg      MCHC 33 8 g/dL      RDW 12 8 %      MPV 11 2 fL      Platelets 850 Thousands/uL      nRBC 0 /100 WBCs      Neutrophils Relative 60 %      Immat GRANS % 0 %      Lymphocytes Relative 29 %      Monocytes Relative 8 %      Eosinophils Relative 2 %      Basophils Relative 1 %      Neutrophils Absolute 4 32 Thousands/µL      Immature Grans Absolute 0 01 Thousand/uL      Lymphocytes Absolute 2 04 Thousands/µL      Monocytes Absolute 0 57 Thousand/µL      Eosinophils Absolute 0 11 Thousand/µL      Basophils Absolute 0 07 Thousands/µL     UA w Reflex to Microscopic w Reflex to Culture [[id number]]     Lab Status: No result Specimen: Urine                  CT renal stone study abdomen pelvis without contrast    (Results Pending)              Procedures  ECG 12 Lead Documentation Only    Date/Time: 6/14/2022 12:02 AM  Performed by: Gordo Kaur PA-C  Authorized by: Gordo Kaur PA-C     Rate:     ECG rate:  55  Rhythm:     Rhythm: sinus rhythm    Ectopy:     Ectopy: none    QRS:     QRS axis:  Normal    QRS intervals:  Normal  Conduction:     Conduction: normal    ST segments:     ST segments:  Normal  T waves:     T waves: normal    Comments:      No acute ischemic changes             ED Course  ED Course as of 06/14/22 0051   Tue Jun 14, 2022   0022 BUN and Cr both elevated from baseline  0025 Patient reporting continued pain, given renal fx will give 2mg morphine  SBIRT 22yo+    Flowsheet Row Most Recent Value   SBIRT (25 yo +)    In order to provide better care to our patients, we are screening all of our patients for alcohol and drug use  Would it be okay to ask you these screening questions? No Filed at: 06/14/2022 0000                    MDM  Number of Diagnoses or Management Options  Acute renal insufficiency  Right flank pain  Diagnosis management comments: CT and urology not available [place] at [facility] therefore transfer initiated, Dr Noman Little accepts at [facility]  Acute renal insufficiency noted as BUN to 40 and Cr elevated to 1 46, baseline appears to be around 1 0  Patient transferred in stable condition  Amount and/or Complexity of Data Reviewed  Clinical lab tests: ordered and reviewed  Tests in the radiology section of CPT®: ordered    Patient Progress  Patient progress: stable      Disposition  Final diagnoses:   Right flank pain   Acute renal insufficiency     Time reflects when diagnosis was documented in both MDM as applicable and the Disposition within this note     Time User Action Codes Description Comment    6/13/2022 11:53 PM Jairo Oris Add [R10 9] Right flank pain     6/14/2022 12:22 AM Jairo Oris Add [R74 8] Elevated creatine kinase     6/14/2022 12:22 AM Jairo Oris Remove [R74 8] Elevated creatine kinase     6/14/2022 12:23 AM Jairo Oris Add [N28 9] Acute renal insufficiency       ED Disposition     ED Disposition   Transfer to Another Facility-In Network    Condition   --    Date/Time   Mon Jun 13, 2022 11:53 PM    Comment   Frida Lew should be transferred out to [facility]             MD Documentation    [address] Most Recent Value   Patient Condition The patient has been stabilized such that within reasonable medical probability, no material deterioration of the patient condition or the condition of the unborn child(jeffry) is likely to result from the transfer   Reason for Transfer Level of Care needed not available at this facility   Benefits of Transfer Specialized equipment and/or services available at the receiving facility (Include comment)________________________  Jessica Das, urology]   Risks of Transfer Potential for delay in receiving treatment, Potential deterioration of medical condition, Loss of IV, Increased discomfort during transfer, Possible worsening of condition or death during transfer   Accepting Physician Dr Andrea Morales [facility]    (Name & Tel number) Carroll Benoit/Armando SAHNI   Provider Certification General risk, such as traffic hazards, adverse weather conditions, rough terrain or turbulence, possible failure of equipment (including vehicle or aircraft), or consequences of actions of persons outside the control of the transport personnel, Unanticipated needs of medical equipment and personnel during transport, Risk of worsening condition      RN Documentation    [address] [facility]    (Name & Tel number) Delon Zarate      Follow-up Information    None         Patient's Medications   Discharge Prescriptions    No medications on file       No discharge procedures on file      PDMP Review     None          ED Provider  Electronically Signed by           Rosemary Hernandez PA-C  06/14/22 5895

## 2022-06-14 NOTE — DISCHARGE INSTRUCTIONS
-please get outpatient ct scan of abdomen/pelvis with IV contrast to evaluate for the lesion with your family doctor

## 2022-06-14 NOTE — EMTALA/ACUTE CARE TRANSFER
[facility] EMERGENCY DEPARTMENT  [address] [facility] [address]  Dept: [address]                                         1960                              MRN [id number]    I have been informed of my rights regarding examination, treatment, and transfer   by Dr Chai Nava MD    Benefits: Specialized equipment and/or services available at the receiving facility (Include comment)________________________ (CT, urology)    Risks: Potential for delay in receiving treatment, Potential deterioration of medical condition, Loss of IV, Increased discomfort during transfer, Possible worsening of condition or death during transfer      Consent for Transfer:  I acknowledge that my medical condition has been evaluated and explained to me by the emergency department physician or other qualified medical person and/or my attending physician, who has recommended that I be transferred to the service of  Accepting Physician: Dr Nara Flores at [address] Name, [address] : [place]  The above potential benefits of such transfer, the potential risks associated with such transfer, and the probable risks of not being transferred have been explained to me, and I fully understand them  The doctor has explained that, in my case, the benefits of transfer outweigh the risks  I agree to be transferred  I authorize the performance of emergency medical procedures and treatments upon me in both transit and upon arrival at the receiving facility  Additionally, I authorize the release of any and all medical records to the receiving facility and request they be transported with me, if possible  I understand that the safest mode of transportation during a medical emergency is an ambulance and that the Hospital advocates the use of this mode of transport   Risks of traveling to the receiving facility by car, including absence of medical control, life sustaining equipment, such as oxygen, and medical personnel has been explained to me and I fully understand them  (ALBAN CORRECT BOX BELOW)  [  ]  I consent to the stated transfer and to be transported by ambulance/helicopter  [  ]  I consent to the stated transfer, but refuse transportation by ambulance and accept full responsibility for my transportation by car  I understand the risks of non-ambulance transfers and I exonerate the Hospital and its staff from any deterioration in my condition that results from this refusal     X___________________________________________    DATE  22  TIME________  Signature of patient or legally responsible individual signing on patient behalf           RELATIONSHIP TO PATIENT_________________________          Provider [facility]                                         1960                              MRN [id number]    A medical screening exam was performed on the above named patient  Based on the examination:    Condition Necessitating Transfer The encounter diagnosis was Right flank pain      Patient Condition: The patient has been stabilized such that within reasonable medical probability, no material deterioration of the patient condition or the condition of the unborn child(jeffry) is likely to result from the transfer    Reason for Transfer: Level of Care needed not available at this facility    Transfer Requirements: Facility \[facility] [place] [facility]\""   · Space available and qualified personnel available for treatment as acknowledged by Nette Otto  · Agreed to accept transfer and to provide appropriate medical treatment as acknowledged by       Dr Isreal Haynes  · Appropriate medical records of the examination and treatment of the patient are provided at the time of transfer   [address], [address] _______  · Transfer will be performed by qualified personnel from    and appropriate transfer equipment as required, including the use of necessary and appropriate life support measures  Provider Certification: I have examined the patient and explained the following risks and benefits of being transferred/refusing transfer to the patient/family:  General risk, such as traffic hazards, adverse weather conditions, rough terrain or turbulence, possible failure of equipment (including vehicle or aircraft), or consequences of actions of persons outside the control of the transport personnel, Unanticipated needs of medical equipment and personnel during transport, Risk of worsening condition      Based on these reasonable risks and benefits to the patient and/or the unborn child(jeffry), and based upon the information available at the time of the patients examination, I certify that the medical benefits reasonably to be expected from the provision of appropriate medical treatments at another medical facility outweigh the increasing risks, if any, to the individuals medical condition, and in the case of labor to the unborn child, from effecting the transfer      X____________________________________________ DATE 06/13/22        TIME_______      ORIGINAL - SEND TO MEDICAL RECORDS   COPY - SEND WITH PATIENT DURING TRANSFER

## 2022-06-14 NOTE — ED NOTES
Pt seen, assessed and d/c by provider  Pt appeared to be in no acute distress upon discharge  Pt able to ambulate well without assistance upon exiting        Devin Hood, KOLTON  06/14/22 8755

## 2022-06-14 NOTE — ED PROVIDER NOTES
History  Chief Complaint   Patient presents with    Flank Pain     Pt sent from [facility] ED to [facility] for rule out kidney stone, sent for CT scan  [age]issue year old male presents with acute onset left-sided flank pain radiating to his left lower abdomen that started today  Pain is sharp continuous currently has no pain as he received pain medications  Admits to nausea denies any vomiting fevers chills dysuria urgency frequency or any other symptoms  Patient was seen earlier at [facility] had blood work and sent over to the [facility] because their CT scanner is down  History provided by:  Patient   used: No        Prior to Admission Medications   Prescriptions Last Dose Informant Patient Reported? Taking? HYDROcodone-acetaminophen (NORCO) 5-325 mg per tablet  Self Yes No   Sig: TAKE 1 TABLET BY MOUTH EVERY DAY AS NEEDED   HYDROcodone-acetaminophen (VICODIN) 5-300 MG per tablet  Self Yes No   Sig: Vicodin 5 mg-300 mg tablet   budesonide-formoterol (SYMBICORT) 160-4 5 mcg/act inhaler  Self Yes No   Sig: Inhale 2 puffs 2 (two) times a day     esomeprazole (NexIUM) 40 MG capsule  Self Yes No   Sig: Take 40 mg by mouth every morning before breakfast    famotidine (PEPCID) 40 MG tablet  Self Yes No   famotidine (PEPCID) 40 MG tablet  Self Yes No   Sig: famotidine 40 mg tablet   fluticasone-umeclidinium-vilanterol (TRELEGY ELLIPTA) 100-62 5-25 MCG/INH inhaler  Self Yes No   Si puff od   fluticasone-umeclidinium-vilanterol (TRELEGY ELLIPTA) 100-62 5-25 MCG/INH inhaler  Self Yes No   Sig: Inhale 1 puff   lidocaine (Lidoderm) 5 %   No No   Sig: Apply 1 patch topically daily Remove & Discard patch within 12 hours or as directed by MD   naproxen (Naprosyn) 500 mg tablet   No No   Sig: Take 1 tablet (500 mg total) by mouth 2 (two) times a day with meals   oxyCODONE-acetaminophen (PERCOCET) 5-325 mg per tablet  Self Yes No   Sig: Every 12 hours   oxyCODONE-acetaminophen (PERCOCET) 5-325 mg per tablet  Self Yes No   Sig: Take 1 tablet by mouth 2 (two) times a day as needed      Facility-Administered Medications: None       Past Medical History:   Diagnosis Date    Cancer ([address] )     skin    COPD (chronic obstructive pulmonary disease) (HCC)     GERD (gastroesophageal reflux disease)        Past Surgical History:   Procedure Laterality Date    HERNIA REPAIR         Family History   Problem Relation Age of Onset    Hypertension Mother      I have reviewed and agree with the history as documented  E-Cigarette/Vaping     E-Cigarette/Vaping Substances     Social History     Tobacco Use    Smoking status: Former Smoker     Years: 0 00    Smokeless tobacco: Never Used   Substance Use Topics    Alcohol use: Yes     Comment: occassional    Drug use: No       Review of Systems   Constitutional: Negative  HENT: Negative  Eyes: Negative  Respiratory: Negative  Cardiovascular: Negative  Gastrointestinal: Positive for abdominal pain  Endocrine: Negative  Genitourinary: Positive for flank pain  Skin: Negative  Allergic/Immunologic: Negative  Neurological: Negative  Hematological: Negative  Psychiatric/Behavioral: Negative  All other systems reviewed and are negative  Physical Exam  Physical Exam  Constitutional:       Appearance: Normal appearance  HENT:      Head: Normocephalic and atraumatic  Nose: Nose normal       Mouth/Throat:      Mouth: Mucous membranes are moist    Eyes:      Extraocular Movements: Extraocular movements intact  Pupils: Pupils are equal, round, and reactive to light  Cardiovascular:      Rate and Rhythm: Normal rate and regular rhythm  Pulmonary:      Effort: Pulmonary effort is normal       Breath sounds: Normal breath sounds  Abdominal:      General: Abdomen is flat  Bowel sounds are normal       Palpations: Abdomen is soft        Comments: Left CVA tenderness left lower abdominal tenderness   Musculoskeletal: General: Normal range of motion  Cervical back: Normal range of motion and neck supple  Skin:     General: Skin is warm  Capillary Refill: Capillary refill takes less than 2 seconds  Neurological:      General: No focal deficit present  Mental Status: He is alert and oriented to person, place, and time  Mental status is at baseline  Psychiatric:         Mood and Affect: Mood normal          Thought Content:  Thought content normal          Vital Signs  ED Triage Vitals   Temperature Pulse Respirations Blood Pressure SpO2   06/14/22 0129 06/14/22 0129 06/14/22 0129 06/14/22 0129 06/14/22 0129   98 °F (36 7 °C) 67 18 163/74 95 %      Temp Source Heart Rate Source Patient Position - Orthostatic VS BP Location FiO2 (%)   06/14/22 0129 06/14/22 0129 06/14/22 0129 06/14/22 0129 --   Oral Monitor Sitting Right arm       Pain Score       06/14/22 0245       8           Vitals:    06/14/22 0129 06/14/22 0245 06/14/22 0330   BP: 163/74 122/64 122/62   Pulse: 67 69 68   Patient Position - Orthostatic VS: Sitting Sitting Sitting         Visual Acuity      ED Medications  Medications - No data to display    Diagnostic Studies  Results Reviewed     Procedure Component Value Units Date/Time    Urine Microscopic [[id number]]  (Abnormal) Collected: 06/14/22 0236    Lab Status: Final result Specimen: Urine, Clean Catch Updated: 06/14/22 0312     RBC, UA 30-50 /hpf      WBC, UA 1-2 /hpf      Epithelial Cells Occasional /hpf      Bacteria, UA Occasional /hpf      Hyaline Casts, UA 0-1 /lpf      MUCUS THREADS Occasional    UA (URINE) with reflex to Scope [[id number]]  (Abnormal) Collected: 06/14/22 0236    Lab Status: Final result Specimen: Urine, Clean Catch Updated: 06/14/22 0244     Color, UA Yellow     Clarity, UA Clear     Specific Gravity, UA 1 025     pH, UA 5 5     Leukocytes, UA Negative     Nitrite, UA Negative     Protein, UA Negative mg/dl      Glucose, UA Negative mg/dl      Ketones, UA Negative mg/dl Urobilinogen, UA 0 2 E U /dl      Bilirubin, UA Negative     Blood, UA Large                 CT renal stone study abdomen pelvis wo contrast   Final Result by Carin Sweeney MD (06/14 9314)      Mild right-sided hydroureter and 2 mm calculus within the dependent bladder, likely recently passed  Bilateral nonobstructing intrarenal calculi  Redemonstrated nonspecific exophytic cystic lesion arising from the left upper pole, minimally increased in size compared to prior study dated 12/27/2015  This can be better characterized with nonemergent follow-up contrast examination  Study was marked in Epic for significant notification  Workstation performed: [id number]                    Procedures  Procedures         ED Course                                             MDM  Number of Diagnoses or Management Options  Renal cyst  Ureteral stone  Diagnosis management comments: Discussed CT findings with the patient and his wife at length  Amount and/or Complexity of Data Reviewed  Clinical lab tests: reviewed and ordered  Tests in the radiology section of CPT®: ordered and reviewed  Tests in the medicine section of CPT®: ordered and reviewed    Patient Progress  Patient progress: stable      Disposition  Final diagnoses:   Ureteral stone   Renal cyst     Time reflects when diagnosis was documented in both MDM as applicable and the Disposition within this note     Time User Action Codes Description Comment    6/14/2022  3:36 AM Sabrina Salazar Add [N20 1] Ureteral stone     6/14/2022  3:44 AM Sabrina Salazar Add [N28 1] Renal cyst       ED Disposition     ED Disposition   Discharge    Condition   Stable    Date/Time   Tue Jun 14, 2022  3:36 AM    Comment   Silver Ng discharge to home/self care                 Follow-up Information     Follow up With Specialties Details Why Contact Info Additional Information    Marilee Mack MD Internal Medicine Schedule an appointment as soon as possible for a visit [address] Emergency Department Emergency Medicine  If symptoms worsen [address] [address]  [address] [place] [address] [address] Emergency Department, [place], [place], [address]    Rupal Urbina MD Urology   [address]  [phone]             Discharge Medication List as of 6/14/2022  3:45 AM      CONTINUE these medications which have NOT CHANGED    Details   budesonide-formoterol (SYMBICORT) 160-4 5 mcg/act inhaler Inhale 2 puffs 2 (two) times a day , Historical Med      esomeprazole (NexIUM) 40 MG capsule Take 40 mg by mouth every morning before breakfast , Historical Med      !! famotidine (PEPCID) 40 MG tablet Starting Tue 2/12/2019, Historical Med      !! famotidine (PEPCID) 40 MG tablet famotidine 40 mg tablet, Historical Med      !! fluticasone-umeclidinium-vilanterol (TRELEGY ELLIPTA) 100-62 5-25 MCG/INH inhaler 1 puff od, Historical Med      !! fluticasone-umeclidinium-vilanterol (TRELEGY ELLIPTA) 100-62 5-25 MCG/INH inhaler Inhale 1 puff, Starting Thu 8/23/2018, Historical Med      HYDROcodone-acetaminophen (NORCO) 5-325 mg per tablet TAKE 1 TABLET BY MOUTH EVERY DAY AS NEEDED, Historical Med      HYDROcodone-acetaminophen (VICODIN) 5-300 MG per tablet Vicodin 5 mg-300 mg tablet, Historical Med      lidocaine (Lidoderm) 5 % Apply 1 patch topically daily Remove & Discard patch within 12 hours or as directed by MD, Starting Thu 3/10/2022, Normal      naproxen (Naprosyn) 500 mg tablet Take 1 tablet (500 mg total) by mouth 2 (two) times a day with meals, Starting Thu 3/10/2022, Normal      !! oxyCODONE-acetaminophen (PERCOCET) 5-325 mg per tablet Every 12 hours, Historical Med      !! oxyCODONE-acetaminophen (PERCOCET) 5-325 mg per tablet Take 1 tablet by mouth 2 (two) times a day as needed, Starting Mon 2/4/2019, Historical Med       !! - Potential duplicate medications found  Please discuss with provider  No discharge procedures on file      PDMP Review     None          ED Provider  Electronically Signed by           Ben Ruff DO  06/14/22 8842

## 2022-11-02 ENCOUNTER — TELEPHONE (OUTPATIENT)
Dept: NEPHROLOGY | Facility: CLINIC | Age: 62
End: 2022-11-02

## 2022-11-02 NOTE — TELEPHONE ENCOUNTER
cNew Patient Intake Form   Patient Details   Angeles Gibbs     1960     [id number]     Insurance Information   Name of [id number] [address]    Does the patient need an insurance referral? no   If patient has Pitney Emily, please ask if they will be using their [employer] technologies  Appointment Information   Who is calling to schedule? If not patient, what is callers name? Spouse   Referring Provider Dr Caroline Mantilla    Referring Provider Number [phone]    Reason for Appt (Diagnosis) kidney stone prevention, CKD    Is patient aware of why they are being referred? Yes   Does Patient have labs done at [place] [address]? If not, where do they go?  no - [facility]   Has patient had labs / urine work done? List date of most recent lab / urine work  yes   Has patient had a BMP & CBC done in the past 2 years? If so, list the date yes   Has patient been hospitalized recently? If yes, list name and location of hospital they were in  no   Has patient been seen by a Nephrologist before? If yes, list name, location and phone number no    Has patient been see by another Speciality before (ex  Neurology, urology, cardiology)? If yes, please list name, and speciality  pulm - Dr Kim Lugo   Has the patient had imaging done? If so, list the most recent date and type of imaging Yes - Ct in epic   Does the patient has a stone analysis report if history of kidney stones? yes    Appointment Details   Is there a referral on file? no    Appointment Date  11/17   Location [facility]   Miscellaneous   patient's most recent labs from 6/2022  Patient's wife states he will go for more labs prior to appt

## 2022-12-22 ENCOUNTER — CONSULT (OUTPATIENT)
Dept: NEPHROLOGY | Facility: CLINIC | Age: 62
End: 2022-12-22

## 2022-12-22 VITALS
DIASTOLIC BLOOD PRESSURE: 70 MMHG | HEART RATE: 76 BPM | WEIGHT: 215 LBS | SYSTOLIC BLOOD PRESSURE: 140 MMHG | HEIGHT: 74 IN | BODY MASS INDEX: 27.59 KG/M2

## 2022-12-22 DIAGNOSIS — N20.0 NEPHROLITHIASIS: Primary | ICD-10-CM

## 2022-12-22 DIAGNOSIS — I10 ESSENTIAL HYPERTENSION: ICD-10-CM

## 2022-12-22 RX ORDER — ROSUVASTATIN CALCIUM 5 MG/1
5 TABLET, COATED ORAL DAILY
COMMUNITY

## 2022-12-22 RX ORDER — CHLORTHALIDONE 25 MG/1
12.5 TABLET ORAL DAILY
Qty: 45 TABLET | Refills: 3 | Status: SHIPPED | OUTPATIENT
Start: 2022-12-22 | End: 2023-03-22

## 2022-12-22 RX ORDER — VALSARTAN 80 MG/1
80 TABLET ORAL DAILY
COMMUNITY

## 2022-12-22 NOTE — PROGRESS NOTES
[address] [place] [address] 65 y o  male MRN: [id number]  Date: 12/22/22  Reason for consultation: Nephrolithiasis    ASSESSMENT and PLAN:  71-year-old male was seen in nephrology office today for evaluation of nephrolithiasis  # Nephrolithiasis  - No stone analysis available  - Recurrent nature of nephrolithiasis suggests calcium nephrolithiasis  - Urine pH 5 5  - No hypercalcemia or metabolic acidosis to suggest primary hyperparathyroidism or renal tubular acidosis respectively    >> Plan  - Start chlorthalidone 12 5 mg daily to help decrease urinary calcium  - Discussed drinking lemonade to provide citrate to increase urinary pH  - He may need potassium citrate in future based on results of Litholink  - Check Litholink to understand urinary environment to guide further therapy  - Discussed increased fluid intake to increase urine output to at least 2 L (this is the backbone of therapy)  - Discussed low-salt intake as high sodium diet increase his urinary calcium excretion  - Discussed low animal protein intake as it leads to higher excretion of calcium and uric acid and lower excretion of citrate  - Discussed low oxalate intake as it can bind calcium in urine to form stones    # Kidney function  - Baseline creatinine 1 0-1 2  - No proteinuria by dipstick urinalysis  - Check U ACR/UPCR    # Hypertension  - Current regimen: Valsartan 80 mg daily  - Blood pressure currently above goal  - Chlorthalidone added as above  - Check BMP/magnesium in 1 month    # 2 cm exophytic lesion arising from the upper pole of the left kidney  - Per CT report; this left renal lesion is evident on multiple studies dating back to 2018 and has not changed   significantly in size  - Continue follow-up with urology    HISTORY OF PRESENT ILLNESS:  Requesting Physician: Juan Miguel Stack MD    Elaine Tan is a 65 y o  male who has history of hypertension and recurrent nephrolithiasis    He is status post right ureteroscopy, laser lithotripsy, basket stone extraction and stent placement in 08/2022  He drinks only 16 ounces of water a day  He consumes animal protein  He consumes high sodium diet  He does not take vitamin C  He does not take vitamin D  He has a family history of nephrolithiasis and siblings have undergone several surgical procedures due to nephrolithiasis  He is currently asymptomatic  He denies dyspnea  He denies leg swelling  He denies any urinary symptoms      PAST MEDICAL HISTORY:  Past Medical History:   Diagnosis Date   • Cancer ([address] )     skin   • COPD (chronic obstructive pulmonary disease) (HCC)    • GERD (gastroesophageal reflux disease)        PAST SURGICAL HISTORY:  Past Surgical History:   Procedure Laterality Date   • HERNIA REPAIR         ALLERGIES:  No Known Allergies    SOCIAL HISTORY:  Social History     Substance and Sexual Activity   Alcohol Use Yes    Comment: occassional     Social History     Substance and Sexual Activity   Drug Use No     Social History     Tobacco Use   Smoking Status Former   • Years: 0 00   • Types: Cigarettes   Smokeless Tobacco Never       FAMILY HISTORY:  Family History   Problem Relation Age of Onset   • Hypertension Mother        MEDICATIONS:    Current Outpatient Medications:   •  chlorthalidone 25 mg tablet, Take 0 5 tablets (12 5 mg total) by mouth daily, Disp: 45 tablet, Rfl: 3  •  esomeprazole (NexIUM) 40 MG capsule, Take 40 mg by mouth every morning before breakfast , Disp: , Rfl:   •  fluticasone-umeclidinium-vilanterol (TRELEGY) 100-62 5-25 MCG/INH inhaler, 1 puff od, Disp: , Rfl:   •  naproxen (Naprosyn) 500 mg tablet, Take 1 tablet (500 mg total) by mouth 2 (two) times a day with meals (Patient taking differently: Take 500 mg by mouth if needed), Disp: 30 tablet, Rfl: 0  •  rosuvastatin (CRESTOR) 5 mg tablet, Take 5 mg by mouth daily, Disp: , Rfl:   •  valsartan (DIOVAN) 80 mg tablet, Take 80 mg by mouth daily, Disp: , Rfl:   • budesonide-formoterol (SYMBICORT) 160-4 5 mcg/act inhaler, Inhale 2 puffs 2 (two) times a day  (Patient not taking: Reported on 12/22/2022), Disp: , Rfl:   •  famotidine (PEPCID) 40 MG tablet, , Disp: , Rfl:   •  famotidine (PEPCID) 40 MG tablet, famotidine 40 mg tablet (Patient not taking: Reported on 12/22/2022), Disp: , Rfl:   •  fluticasone-umeclidinium-vilanterol (TRELEGY ELLIPTA) 100-62 5-25 MCG/INH inhaler, Inhale 1 puff, Disp: , Rfl:   •  HYDROcodone-acetaminophen (NORCO) 5-325 mg per tablet, TAKE 1 TABLET BY MOUTH EVERY DAY AS NEEDED (Patient not taking: Reported on 12/22/2022), Disp: , Rfl:   •  HYDROcodone-acetaminophen (XODOL) 5-300 MG per tablet, Vicodin 5 mg-300 mg tablet (Patient not taking: Reported on 12/22/2022), Disp: , Rfl:   •  lidocaine (Lidoderm) 5 %, Apply 1 patch topically daily Remove & Discard patch within 12 hours or as directed by MD, Disp: 15 patch, Rfl: 0  •  oxyCODONE-acetaminophen (PERCOCET) 5-325 mg per tablet, Every 12 hours (Patient not taking: Reported on 12/22/2022), Disp: , Rfl:   •  oxyCODONE-acetaminophen (PERCOCET) 5-325 mg per tablet, Take 1 tablet by mouth 2 (two) times a day as needed (Patient not taking: Reported on 12/22/2022), Disp: , Rfl: 0    REVIEW OF SYSTEMS:  Review of Systems   Constitutional: Negative for chills and fever  HENT: Negative for ear pain and sore throat  Eyes: Negative for pain and visual disturbance  Respiratory: Negative for cough and shortness of breath  Cardiovascular: Negative for chest pain and palpitations  Gastrointestinal: Negative for abdominal pain and vomiting  Genitourinary: Negative for dysuria and hematuria  Musculoskeletal: Negative for arthralgias and back pain  Skin: Negative for color change and rash  Neurological: Negative for seizures and syncope  All other systems reviewed and are negative  All the systems were reviewed and were negative except as documented on the HPI      PHYSICAL EXAMINATION:  BP 140/70   Pulse 76   Ht 6' 2" (1 88 m)   Wt 97 5 kg (215 lb)   BMI 27 60 kg/m²   Current Weight: Weight - Scale: 97 5 kg (215 lb) Body mass index is 27 6 kg/m²  Physical Exam  Constitutional:       Appearance: Normal appearance  HENT:      Head: Normocephalic and atraumatic  Mouth/Throat:      Mouth: Mucous membranes are moist       Pharynx: Oropharynx is clear  Cardiovascular:      Rate and Rhythm: Normal rate and regular rhythm  Pulses: Normal pulses  Heart sounds: Normal heart sounds  Pulmonary:      Effort: Pulmonary effort is normal       Breath sounds: Normal breath sounds  Abdominal:      General: Bowel sounds are normal       Palpations: Abdomen is soft  Musculoskeletal:         General: Normal range of motion  Right lower leg: No edema  Left lower leg: No edema  Skin:     General: Skin is warm and dry  Neurological:      General: No focal deficit present  Mental Status: He is alert and oriented to person, place, and time  Mental status is at baseline  Psychiatric:         Mood and Affect: Mood normal          Behavior: Behavior normal          Thought Content:  Thought content normal          Judgment: Judgment normal          LABORATORY RESULTS:  Lab Results   Component Value Date     12/27/2015    K 3 2 (L) 06/13/2022     06/13/2022    CO2 27 06/13/2022    ANIONGAP 4 12/27/2015    BUN 40 (H) 06/13/2022    CREATININE 1 46 (H) 06/13/2022    GLUCOSE 99 12/27/2015    CALCIUM 9 5 06/13/2022    AST 18 06/13/2022    ALT 20 06/13/2022    ALKPHOS 58 06/13/2022    PROT 6 9 12/27/2015    BILITOT 0 39 12/27/2015    EGFR 51 06/13/2022     Lab Results   Component Value Date    WBC 7 12 06/13/2022    HGB 15 4 06/13/2022    HCT 45 5 06/13/2022    MCV 90 06/13/2022     06/13/2022     Lab Results   Component Value Date    CALCIUM 9 5 06/13/2022

## 2022-12-22 NOTE — PATIENT INSTRUCTIONS
Kidney Stone Prevention    Fluid Intake    A high fluid intake is one of the most important cornerstones of kidney stone dietary prevention  A sufficiently dilute urine will prevent the individual chemical components of stones from becoming concentrated enough to precipitate out of solution, keeping them instead in their dissolved state  A high urine output also may reduce stone from forming through "flushing" out of stone components and prevention of urine stagnation  In addition to stone benefits, increased water intake has been shown to have a multitude of other benefits, including improved alertness, better skin appearance, enhanced physical performance, reduced constipation and enhanced weight loss  The average daily urine output for normal healthy adults is 1 2 liters a day, ranging from 1 to 2 liters in most individuals and varying with body weight and gender  In stone formers, however, a higher daily urine output is required for stone prevention and achieving a daily volume of at least 2 0 to 2 5 liters a day can significantly reduce the recurrence of future stones  In a randomized study of stone formers who were given specific instructions to increase their fluid intake compared to stone formers told to not change their diet, those given specific fluid instructions achieved a high urine output of 2 6 liters a day versus 1 0 liters a day in those not given dietary instructions  Over a period of five years, the high fluid intake group was half as likely to form new stones as compared to the normal fluid intake group (El et al, J Urol 1996)  We recommend that most stone formers increase their daily fluid intake by one liter (an additional two 16 oz water bottles or two tall glasses a day) in order to achieve a urine output of 2 5 liters a day  (One liter = 4 2 8-oz glasses or 34 oz)   Alternatively, a 24 hour urine collection can be performed to guide fluid intake to achieve 2 5 liters of urine output in a specific patient  Type of Fluid Intake    The type of fluid intake is generally less important than the total intake  While drinking water is our preferred recommendation because it is inexpensive and contains no calories, for stone patients who do not enjoy drinking water, any beverage will be beneficial in reducing stone risk  Contrary to popular belief, studies have found that an increased intake of tea, coffee, and alcoholic beverage actually reduces the risk of stones, possibly because of an associated increase in urine output (Pankaj et al, Am J of Epidemiology, 1996)  While tea contains high levels of oxalate, this does not appear to result in increased stone formation for the reasons discussed below in discussion on oxalate  Soda intake (including lv) and milk intake also do not appear to increase the risk of stones  Citrus Fruit Juices    Citrus juices, including lemon juice and orange juice, contain citrate, which acts as a stone inhibitor for calcium based stones  Citrate seems to do this by binding calcium, making it unavailable to combine with oxalate or phosphate: a necessary first step in the formation of stones  Citrate also seems to make it more difficult for stones to grow once they've formed  Drinking citrus juice in the form of concentrated lemon juice mixed with water has been shown to effectively increase urinary citrate levels and reduce urinary calcium levels, both of which will reduce stone risk  Orange juice will similarly increase urinary citrate levels  However, orange juice appears to also increase urinary oxalate levels ( a stone promoter)  Other sources of citrate, including grapefruit juice, have had less research completed confirming their beneficial effects on urinary citrate levels  Therefore, lemon juice is typically favored over the other citrus juices as a natural method to increase urinary citrate levels   Many patients find drinking citrus juices to be an attractive alternative to pharmaceutical treatment with potassium citrate  We recommend that stone formers consider supplementing their daily fluid intake with a mixture of 60 ml of concentrated lemon juice in one liter of water to increase their urinary citrate levels  Salt    A high sodium intake increases the risk of stone formation by increasing calcium levels and decreasing citrate (a stone inhibitor) levels in urine  Additionally, high sodium intake will impair the ability of medications such as hydrochlorothiazide to effectively reduce calcium levels in urine  A study of stone formers who were kept on a strict diet with a maximum daily sodium intake of 50 mmol (1200 mg) in addition to a reduced protein diet demonstrated that the low sodium diet was effective in reducing stone recurrence by 50% as compared to the low calcium diet  We recommend that stone formers aim to follow the FDA's guideline of limiting salt intake to 2300 mg of sodium a day in the general population and 1500 mg of sodium a day in those with hypertension,  Americans, or middle aged and older adults  2300 mg is equivalent to about 1 teaspoon of table salt  The best way to determine the salt content of your food is to read the nutrition label  Processed foods tend to contain higher amounts of salt  Choose low sodium options whenever possible  1 cup of canned chicken noodle soup contains 870 mg of sodium  A fried chicken drumstick contains 310 mg of sodium  A serving of shrimp contains 240 mg of sodium  2 slices of white bread contains 200 mg of sodium  15 potato chips contain 180 mg of sodium  1 container of strawberry yogurt contains 85 mg of sodium  1 tomato contains 20 mg of sodium  1 apple contains 0 mg of sodium      In addition to lowering the risk of stones, a low sodium intake helps to control or prevent high blood pressure, which can lead to heart disease, stroke, heart failure, and kidney disease  Animal Protein    Animal protein in meat products increases the risk of stone by increasing calcium, oxalate, and uric acid levels in urine  All three of these changes increase the risk of stones  In studies comparing high meat eaters versus low meat eaters, high meat eaters were found to be at increased risk of forming stones  A randomized study of stone formers restricted to a low meat intake of 52 grams a day (equivalent to 8 oz of beef) in combination with sodium restriction found that the combination reduced stone recurrence by 50% compared to calcium restriction alone (El et al, Guero Kwok 2002)  We recommend that most stone formers try to reduce their meat intake to 6 oz a day  This includes all types of meat: beef, pork, poultry, and seafood  The USDA has recommended a daily allowance of 5-6 oz of protein intake among adults  They also recommend choosing non-meat protein foods such as nuts and beans instead of meat sources  Protein from non-meat sources does not appear to increase the risk of stones  A small steak contains about 3-4 oz of protein  A quarter pound hamburger with cheese contains 4 oz of protein  A chicken breast contains about 5 oz of protein, a chicken thigh about 2 5 oz, a chicken drumstick about 1 5 oz  One 5 oz can of tuna contains 5 oz of protein  1 medium egg contains 1 oz of protein  Lowering your animal protein intake and eating more fruits and vegetables also benefits your overall health by limiting the amount of saturated fats and cholesterol in your diet  This helps to reduce your risk of cardiovascular disease  Oxalate    While oxalate plays an important role in the development of calcium oxalate stones, dietary restriction does not appear to be effective in reducing the risk of stones in the majority of patients  About 40% of urinary oxalate comes from dietary sources while the remainder is naturally made within the liver   Therefore, reducing oxalate dietary intake does not always have a significant impact on total urinary oxalate levels  Oxalate is found in many vegetable and fruits, including many healthy dietary choices often making it difficult to achieve a low oxalate diet  Because of these issues, oxalate avoidance is beneficial primarily in those individuals with specific abnormalities that lead to high oxalate urinary levels  We recommend that most stone formers should maintain a normal oxalate intake without the need for oxalate restriction  High oxalate intake should be avoided in individuals found to have high urinary oxalate levels on metabolic evaluation  Oxalate restriction may be beneficial in certain individuals with high urinary oxalate levels  Oxalate Rich Foods    Tea (black)  Spinach  Mustard Greens  Chard  Beets  Rhubarb  Okra  Berries  Chocolate  Nuts  Sweet Potatoes        Calcium    Kidney Stone formers often question whether to stop or reduce their calcium intake  Despite the fact that calcium is a major component of 75% of stones, excessive calcium intake is vary rarely the cause of stone formation  In fact, several studies have shown that restricting calcium intake in most stone formers actually increases the number of stones they develop  This appears to happen because when less calcium is ingested, it becomes easier for oxalate (which normally binds with calcium in the gut) to be absorbed  Higher levels of oxalate in the urine then lead to an increase in stone risk  Calcium obtained from dietary sources appears to be better than calcium from supplements in regards to lowering stone risk because supplements can actually increase your risk of stones slightly (by 17%) while dietary calcium intake is instead associated with lower stone risk  If you need to take supplements, taking them during meals appear to be better in terms of stone risk      We recommend that stone formers maintain a normal calcium intake, preferably from dietary sources  Female stone formers taking calcium supplementation to prevent osteoporosis experience a slightly increased risk of stones (17%) which needs to be balanced against their risk of osteoporosis

## 2023-10-19 ENCOUNTER — PREP FOR PROCEDURE (OUTPATIENT)
Dept: GASTROENTEROLOGY | Facility: CLINIC | Age: 63
End: 2023-10-19

## 2023-10-19 ENCOUNTER — TELEPHONE (OUTPATIENT)
Dept: GASTROENTEROLOGY | Facility: CLINIC | Age: 63
End: 2023-10-19

## 2023-10-19 DIAGNOSIS — Z12.11 SCREENING FOR COLON CANCER: Primary | ICD-10-CM

## 2023-10-19 NOTE — TELEPHONE ENCOUNTER
10/19/23  Screened by: Matthew Holm MA    Referring Provider oa    Pre- Screening: There is no height or weight on file to calculate BMI. Has patient been referred for a routine screening Colonoscopy? yes  Is the patient between 43-73 years old? yes      Previous Colonoscopy yes   If yes:    Date: 10-15 years ago    Facility: [facility]    Reason:       SCHEDULING STAFF: If the patient is between 39yrs-51yrs, please advise patient to confirm benefits/coverage with their insurance company for a routine screening colonoscopy, some insurance carriers will only cover at [facility] or [facility]. If the patient is over 66years old, please schedule an office visit. Does the patient want to see a Gastroenterologist prior to their procedure OR are they having any GI symptoms? no    Has the patient been hospitalized or had abdominal surgery in the past 6 months? no    Does the patient use supplemental oxygen? no    Does the patient take Coumadin, Lovenox, Plavix, Elliquis, Xarelto, or other blood thinning medication? no    Has the patient had a stroke, cardiac event, or stent placed in the past year? no    SCHEDULING STAFF: If patient answers NO to above questions, then schedule procedure. If patient answers YES to above questions, then schedule office appointment. If patient is between 45yrs - 49yrs, please advise patient that we will have to confirm benefits & coverage with their insurance company for a routine screening colonoscopy.

## 2023-10-19 NOTE — TELEPHONE ENCOUNTER
Scheduled date of colonoscopy (as of today): 10/27/23  Physician performing colonoscopy: zandra  Location of colonoscopy: [facility]  Bowel prep reviewed with patient: og/ jean marie  Instructions reviewed with patient by: jackelyn sent to [facility]  Clearances: none

## 2023-10-20 ENCOUNTER — RA CDI HCC (OUTPATIENT)
Dept: OTHER | Facility: HOSPITAL | Age: 63
End: 2023-10-20

## 2023-10-20 ENCOUNTER — ANESTHESIA (OUTPATIENT)
Dept: ANESTHESIOLOGY | Facility: AMBULATORY SURGERY CENTER | Age: 63
End: 2023-10-20

## 2023-10-20 ENCOUNTER — ANESTHESIA EVENT (OUTPATIENT)
Dept: ANESTHESIOLOGY | Facility: AMBULATORY SURGERY CENTER | Age: 63
End: 2023-10-20

## 2023-10-20 NOTE — PROGRESS NOTES
[address] [facility] coding opportunities       Chart reviewed, no opportunity found: CHART REVIEWED, NO OPPORTUNITY FOUND        Patients Insurance        Commercial Insurance: [place] Bermudez

## 2023-10-25 ENCOUNTER — TELEPHONE (OUTPATIENT)
Dept: GASTROENTEROLOGY | Facility: AMBULATORY SURGERY CENTER | Age: 63
End: 2023-10-25

## 2023-10-27 ENCOUNTER — ANESTHESIA (OUTPATIENT)
Dept: GASTROENTEROLOGY | Facility: AMBULATORY SURGERY CENTER | Age: 63
End: 2023-10-27

## 2023-10-27 ENCOUNTER — HOSPITAL ENCOUNTER (OUTPATIENT)
Dept: GASTROENTEROLOGY | Facility: AMBULATORY SURGERY CENTER | Age: 63
Discharge: HOME | End: 2023-10-27
Attending: COLON & RECTAL SURGERY
Payer: COMMERCIAL

## 2023-10-27 ENCOUNTER — ANESTHESIA EVENT (OUTPATIENT)
Dept: GASTROENTEROLOGY | Facility: AMBULATORY SURGERY CENTER | Age: 63
End: 2023-10-27

## 2023-10-27 VITALS
WEIGHT: 201 LBS | HEART RATE: 60 BPM | HEIGHT: 74 IN | TEMPERATURE: 97.6 F | SYSTOLIC BLOOD PRESSURE: 156 MMHG | RESPIRATION RATE: 18 BRPM | DIASTOLIC BLOOD PRESSURE: 70 MMHG | OXYGEN SATURATION: 97 % | BODY MASS INDEX: 25.8 KG/M2

## 2023-10-27 DIAGNOSIS — Z12.11 SCREENING FOR COLON CANCER: ICD-10-CM

## 2023-10-27 PROBLEM — K21.9 GERD (GASTROESOPHAGEAL REFLUX DISEASE): Status: ACTIVE | Noted: 2023-10-27

## 2023-10-27 PROBLEM — I10 HTN (HYPERTENSION): Status: ACTIVE | Noted: 2023-10-27

## 2023-10-27 PROBLEM — G47.30 SLEEP APNEA: Status: ACTIVE | Noted: 2023-10-27

## 2023-10-27 PROBLEM — J44.9 COPD (CHRONIC OBSTRUCTIVE PULMONARY DISEASE) (HCC): Status: ACTIVE | Noted: 2023-10-27

## 2023-10-27 PROBLEM — E78.5 HYPERLIPIDEMIA: Status: ACTIVE | Noted: 2023-10-27

## 2023-10-27 PROCEDURE — 45385 COLONOSCOPY W/LESION REMOVAL: CPT | Performed by: COLON & RECTAL SURGERY

## 2023-10-27 PROCEDURE — 88305 TISSUE EXAM BY PATHOLOGIST: CPT | Performed by: STUDENT IN AN ORGANIZED HEALTH CARE EDUCATION/TRAINING PROGRAM

## 2023-10-27 RX ORDER — SODIUM CHLORIDE, SODIUM LACTATE, POTASSIUM CHLORIDE, CALCIUM CHLORIDE 600; 310; 30; 20 MG/100ML; MG/100ML; MG/100ML; MG/100ML
50 INJECTION, SOLUTION INTRAVENOUS CONTINUOUS
Status: DISCONTINUED | OUTPATIENT
Start: 2023-10-27 | End: 2023-10-31 | Stop reason: HOSPADM

## 2023-10-27 RX ORDER — FLUTICASONE FUROATE, UMECLIDINIUM BROMIDE AND VILANTEROL TRIFENATATE 200; 62.5; 25 UG/1; UG/1; UG/1
1 POWDER RESPIRATORY (INHALATION) DAILY
COMMUNITY

## 2023-10-27 RX ORDER — PROPOFOL 10 MG/ML
INJECTION, EMULSION INTRAVENOUS AS NEEDED
Status: DISCONTINUED | OUTPATIENT
Start: 2023-10-27 | End: 2023-10-27

## 2023-10-27 RX ADMIN — PROPOFOL 50 MG: 10 INJECTION, EMULSION INTRAVENOUS at 08:48

## 2023-10-27 RX ADMIN — PROPOFOL 50 MG: 10 INJECTION, EMULSION INTRAVENOUS at 08:58

## 2023-10-27 RX ADMIN — PROPOFOL 100 MG: 10 INJECTION, EMULSION INTRAVENOUS at 08:47

## 2023-10-27 RX ADMIN — PROPOFOL 20 MG: 10 INJECTION, EMULSION INTRAVENOUS at 09:03

## 2023-10-27 RX ADMIN — SODIUM CHLORIDE, SODIUM LACTATE, POTASSIUM CHLORIDE, CALCIUM CHLORIDE: 600; 310; 30; 20 INJECTION, SOLUTION INTRAVENOUS at 08:29

## 2023-10-27 RX ADMIN — PROPOFOL 50 MG: 10 INJECTION, EMULSION INTRAVENOUS at 08:50

## 2023-10-27 NOTE — ANESTHESIA POSTPROCEDURE EVALUATION
Post-Op Assessment Note    CV Status:  Stable  Pain Score: 0    Pain management: adequate     Mental Status:  Alert and awake   Hydration Status:  Euvolemic and stable   PONV Controlled:  None   Airway Patency:  Patent      Post Op Vitals Reviewed: Yes      Staff: CRNA         No notable events documented.     /57 (10/27/23 0907)    Temp      Pulse 67 (10/27/23 0907)   Resp 19 (10/27/23 0907)    SpO2 99 % (10/27/23 0907)

## 2023-10-27 NOTE — ANESTHESIA PREPROCEDURE EVALUATION
Procedure:  COLONOSCOPY    Relevant Problems   ANESTHESIA   (-) History of anesthesia complications      CARDIO   (+) HTN (hypertension)   (+) Hyperlipidemia   (-) Chest pain   (-) RASHID (dyspnea on exertion)      GI/HEPATIC   (+) GERD (gastroesophageal reflux disease)      PULMONARY   (+) COPD (chronic obstructive pulmonary disease) (HCC)   (+) Sleep apnea (Uses CPAP)   (-) Shortness of breath   (-) URI (upper respiratory infection)        Physical Exam    Airway    Mallampati score: II  TM Distance: >3 FB  Neck ROM: full     Dental       Cardiovascular      Pulmonary      Other Findings        Anesthesia Plan  ASA Score- 2     Anesthesia Type- IV sedation with anesthesia with ASA Monitors. Additional Monitors:     Airway Plan:            Plan Factors-Exercise tolerance (METS): >4 METS. Chart reviewed. EKG reviewed. Existing labs reviewed. Patient summary reviewed. Induction- intravenous. Postoperative Plan-     Informed Consent- Anesthetic plan and risks discussed with patient. I personally reviewed this patient with the CRNA. Discussed and agreed on the Anesthesia Plan with the CRNA. Manan Mckeon

## 2023-10-27 NOTE — H&P
History and Physical   Colon and Rectal Surgery   Cliff Lema 59 y.o. male MRN: [id number]  Unit/Bed#:  Encounter: [id number]  10/27/23   8:40 AM      CC:  Screening    History of Present Illness   HPI:  Cliff Lema is a 59 y.o. male with no GI symptoms. Historical Information   Past Medical History:   Diagnosis Date    Cancer ([address])     skin    COPD (chronic obstructive pulmonary disease) (HCC)     GERD (gastroesophageal reflux disease)     HTN (hypertension) 10/27/2023    Hyperlipidemia     Hyperlipidemia 10/27/2023    Hypertension     Kidney stone     Sleep apnea      Past Surgical History:   Procedure Laterality Date    COLONOSCOPY      HERNIA REPAIR      KIDNEY STONE SURGERY      could not find stone    TUMOR EXCISION      fatty tumor neck- benign       Meds/Allergies     (Not in a hospital admission)        Current Outpatient Medications:     esomeprazole (NexIUM) 40 MG capsule, Take 40 mg by mouth every morning before breakfast., Disp: , Rfl:     fluticasone-umeclidinium-vilanterol (Trelegy Ellipta) 200-62.5-25 mcg/actuation AEPB inhaler, Inhale 1 puff daily Rinse mouth after use., Disp: , Rfl:     Multiple Vitamin (MULTIVITAMIN PO), Take by mouth in the morning, Disp: , Rfl:     valsartan (DIOVAN) 80 mg tablet, Take 80 mg by mouth daily, Disp: , Rfl:     budesonide-formoterol (SYMBICORT) 160-4.5 mcg/act inhaler, Inhale 2 puffs 2 (two) times a day.  (Patient not taking: Reported on 12/22/2022), Disp: , Rfl:     chlorthalidone 25 mg tablet, Take 0.5 tablets (12.5 mg total) by mouth daily, Disp: 45 tablet, Rfl: 3    famotidine (PEPCID) 40 MG tablet, , Disp: , Rfl:     famotidine (PEPCID) 40 MG tablet, famotidine 40 mg tablet (Patient not taking: Reported on 12/22/2022), Disp: , Rfl:     fluticasone-umeclidinium-vilanterol (TRELEGY ELLIPTA) 100-62.5-25 MCG/INH inhaler, Inhale 1 puff, Disp: , Rfl:     fluticasone-umeclidinium-vilanterol (TRELEGY) 100-62.5-25 MCG/INH inhaler, 1 puff od, Disp: , Rfl: HYDROcodone-acetaminophen (NORCO) 5-325 mg per tablet, TAKE 1 TABLET BY MOUTH EVERY DAY AS NEEDED (Patient not taking: Reported on 12/22/2022), Disp: , Rfl:     HYDROcodone-acetaminophen (XODOL) 5-300 MG per tablet, Vicodin 5 mg-300 mg tablet (Patient not taking: Reported on 12/22/2022), Disp: , Rfl:     lidocaine (Lidoderm) 5 %, Apply 1 patch topically daily Remove & Discard patch within 12 hours or as directed by MD, Disp: 15 patch, Rfl: 0    naproxen (Naprosyn) 500 mg tablet, Take 1 tablet (500 mg total) by mouth 2 (two) times a day with meals (Patient taking differently: Take 500 mg by mouth if needed), Disp: 30 tablet, Rfl: 0    oxyCODONE-acetaminophen (PERCOCET) 5-325 mg per tablet, Every 12 hours (Patient not taking: Reported on 12/22/2022), Disp: , Rfl:     oxyCODONE-acetaminophen (PERCOCET) 5-325 mg per tablet, Take 1 tablet by mouth 2 (two) times a day as needed (Patient not taking: Reported on 12/22/2022), Disp: , Rfl: 0    rosuvastatin (CRESTOR) 5 mg tablet, Take 5 mg by mouth daily, Disp: , Rfl:     Current Facility-Administered Medications:     lactated ringers infusion, 50 mL/hr, Intravenous, Continuous, Joaquin Rogers MD    No Known Allergies      Social History   Social History     Substance and Sexual Activity   Alcohol Use Yes    Comment: occassional     Social History     Substance and Sexual Activity   Drug Use No     Social History     Tobacco Use   Smoking Status Former    Years: 0.00    Types: Cigarettes    Quit date: 2008    Years since quitting: 15.8   Smokeless Tobacco Never         Family History:   Family History   Problem Relation Age of Onset    Hypertension Mother          Objective     Current Vitals:   Blood Pressure: 146/70 (10/27/23 0753)  Pulse: 64 (10/27/23 0753)  Temperature: 97.6 °F (36.4 °C) (10/27/23 0753)  Temp Source: Temporal (10/27/23 0753)  Respirations: 18 (10/27/23 0753)  Height: 6' 2" (188 cm) (10/27/23 0753)  Weight - Scale: 91.2 kg (201 lb) (10/27/23 0753)  SpO2: 96 % (10/27/23 0753)  No intake or output data in the 24 hours ending 10/27/23 0840    Physical Exam:  General:  Well nourished, no distress. Neuro: Alert and oriented  Eyes:Sclera anicteric, conjunctiva pink. Pulm: Clear to auscultation bilaterally. No respiratory Distress. CV:  Regular rate and rhythm. No murmurs. Abdomen:  Soft, flat, non-tender, without masses or hepatosplenomegaly. Lab Results:       ASSESSMENT:  Alida Course is a 59 y.o. male for screening. PLAN:  Colonoscopy. Risks , including, but not limited to, bleeding, perforation, missed lesions, and potential need for surgery, were reviewed. Alternatives to colonoscopy were discussed.   Riley Ferrell MD

## 2023-11-02 PROCEDURE — 88305 TISSUE EXAM BY PATHOLOGIST: CPT | Performed by: STUDENT IN AN ORGANIZED HEALTH CARE EDUCATION/TRAINING PROGRAM

## 2024-02-21 PROBLEM — Z12.5 SCREENING FOR PROSTATE CANCER: Status: RESOLVED | Noted: 2019-02-14 | Resolved: 2024-02-21

## 2024-03-28 ENCOUNTER — APPOINTMENT (EMERGENCY)
Dept: CT IMAGING | Facility: HOSPITAL | Age: 64
End: 2024-03-28
Payer: COMMERCIAL

## 2024-03-28 ENCOUNTER — HOSPITAL ENCOUNTER (EMERGENCY)
Facility: HOSPITAL | Age: 64
Discharge: HOME | End: 2024-03-28
Attending: EMERGENCY MEDICINE
Payer: COMMERCIAL

## 2024-03-28 VITALS
DIASTOLIC BLOOD PRESSURE: 73 MMHG | SYSTOLIC BLOOD PRESSURE: 149 MMHG | WEIGHT: 211.86 LBS | TEMPERATURE: 97.7 F | OXYGEN SATURATION: 97 % | HEIGHT: 74 IN | BODY MASS INDEX: 27.19 KG/M2 | RESPIRATION RATE: 18 BRPM | HEART RATE: 79 BPM

## 2024-03-28 DIAGNOSIS — H53.9 VISUAL DISTURBANCE: ICD-10-CM

## 2024-03-28 DIAGNOSIS — R51.9 HEADACHE: Primary | ICD-10-CM

## 2024-03-28 LAB
ALBUMIN SERPL BCP-MCNC: 4.2 G/DL (ref 3.5–5)
ALP SERPL-CCNC: 58 U/L (ref 34–104)
ALT SERPL W P-5'-P-CCNC: 23 U/L (ref 7–52)
ANION GAP SERPL CALCULATED.3IONS-SCNC: 5 MMOL/L (ref 4–13)
AST SERPL W P-5'-P-CCNC: 17 U/L (ref 13–39)
BASOPHILS # BLD AUTO: 0.06 THOUSANDS/ÂΜL (ref 0–0.1)
BASOPHILS NFR BLD AUTO: 1 % (ref 0–1)
BILIRUB SERPL-MCNC: 0.7 MG/DL (ref 0.2–1)
BUN SERPL-MCNC: 26 MG/DL (ref 5–25)
CALCIUM SERPL-MCNC: 9.3 MG/DL (ref 8.4–10.2)
CHLORIDE SERPL-SCNC: 106 MMOL/L (ref 96–108)
CO2 SERPL-SCNC: 31 MMOL/L (ref 21–32)
CREAT SERPL-MCNC: 1.31 MG/DL (ref 0.6–1.3)
EOSINOPHIL # BLD AUTO: 0.11 THOUSAND/ÂΜL (ref 0–0.61)
EOSINOPHIL NFR BLD AUTO: 1 % (ref 0–6)
ERYTHROCYTE [DISTWIDTH] IN BLOOD BY AUTOMATED COUNT: 13.1 % (ref 11.6–15.1)
GFR SERPL CREATININE-BSD FRML MDRD: 57 ML/MIN/1.73SQ M
GLUCOSE SERPL-MCNC: 134 MG/DL (ref 65–140)
HCT VFR BLD AUTO: 48.2 % (ref 36.5–49.3)
HGB BLD-MCNC: 16.1 G/DL (ref 12–17)
IMM GRANULOCYTES # BLD AUTO: 0.03 THOUSAND/UL (ref 0–0.2)
IMM GRANULOCYTES NFR BLD AUTO: 0 % (ref 0–2)
LYMPHOCYTES # BLD AUTO: 1.51 THOUSANDS/ÂΜL (ref 0.6–4.47)
LYMPHOCYTES NFR BLD AUTO: 18 % (ref 14–44)
MCH RBC QN AUTO: 31.1 PG (ref 26.8–34.3)
MCHC RBC AUTO-ENTMCNC: 33.4 G/DL (ref 31.4–37.4)
MCV RBC AUTO: 93 FL (ref 82–98)
MONOCYTES # BLD AUTO: 0.55 THOUSAND/ÂΜL (ref 0.17–1.22)
MONOCYTES NFR BLD AUTO: 7 % (ref 4–12)
NEUTROPHILS # BLD AUTO: 6.19 THOUSANDS/ÂΜL (ref 1.85–7.62)
NEUTS SEG NFR BLD AUTO: 73 % (ref 43–75)
NRBC BLD AUTO-RTO: 0 /100 WBCS
PLATELET # BLD AUTO: 182 THOUSANDS/UL (ref 149–390)
PMV BLD AUTO: 10.7 FL (ref 8.9–12.7)
POTASSIUM SERPL-SCNC: 4 MMOL/L (ref 3.5–5.3)
PROT SERPL-MCNC: 6.9 G/DL (ref 6.4–8.4)
RBC # BLD AUTO: 5.18 MILLION/UL (ref 3.88–5.62)
SODIUM SERPL-SCNC: 142 MMOL/L (ref 135–147)
WBC # BLD AUTO: 8.45 THOUSAND/UL (ref 4.31–10.16)

## 2024-03-28 PROCEDURE — 80053 COMPREHEN METABOLIC PANEL: CPT

## 2024-03-28 PROCEDURE — 99284 EMERGENCY DEPT VISIT MOD MDM: CPT | Performed by: EMERGENCY MEDICINE

## 2024-03-28 PROCEDURE — 70450 CT HEAD/BRAIN W/O DYE: CPT

## 2024-03-28 PROCEDURE — 99284 EMERGENCY DEPT VISIT MOD MDM: CPT

## 2024-03-28 PROCEDURE — 96361 HYDRATE IV INFUSION ADD-ON: CPT

## 2024-03-28 PROCEDURE — 36415 COLL VENOUS BLD VENIPUNCTURE: CPT

## 2024-03-28 PROCEDURE — 85025 COMPLETE CBC W/AUTO DIFF WBC: CPT

## 2024-03-28 PROCEDURE — 96375 TX/PRO/DX INJ NEW DRUG ADDON: CPT

## 2024-03-28 PROCEDURE — 96365 THER/PROPH/DIAG IV INF INIT: CPT

## 2024-03-28 RX ORDER — METOCLOPRAMIDE HYDROCHLORIDE 5 MG/ML
10 INJECTION INTRAMUSCULAR; INTRAVENOUS ONCE
Status: COMPLETED | OUTPATIENT
Start: 2024-03-28 | End: 2024-03-28

## 2024-03-28 RX ORDER — MAGNESIUM SULFATE HEPTAHYDRATE 40 MG/ML
2 INJECTION, SOLUTION INTRAVENOUS ONCE
Status: COMPLETED | OUTPATIENT
Start: 2024-03-28 | End: 2024-03-28

## 2024-03-28 RX ORDER — DIPHENHYDRAMINE HYDROCHLORIDE 50 MG/ML
25 INJECTION INTRAMUSCULAR; INTRAVENOUS ONCE
Status: COMPLETED | OUTPATIENT
Start: 2024-03-28 | End: 2024-03-28

## 2024-03-28 RX ORDER — DEXAMETHASONE SODIUM PHOSPHATE 10 MG/ML
10 INJECTION, SOLUTION INTRAMUSCULAR; INTRAVENOUS ONCE
Status: COMPLETED | OUTPATIENT
Start: 2024-03-28 | End: 2024-03-28

## 2024-03-28 RX ADMIN — DEXAMETHASONE SODIUM PHOSPHATE 10 MG: 10 INJECTION INTRAMUSCULAR; INTRAVENOUS at 16:58

## 2024-03-28 RX ADMIN — MAGNESIUM SULFATE HEPTAHYDRATE 2 G: 40 INJECTION, SOLUTION INTRAVENOUS at 17:01

## 2024-03-28 RX ADMIN — METOCLOPRAMIDE 10 MG: 5 INJECTION, SOLUTION INTRAMUSCULAR; INTRAVENOUS at 16:56

## 2024-03-28 RX ADMIN — SODIUM CHLORIDE 1000 ML: 0.9 INJECTION, SOLUTION INTRAVENOUS at 16:53

## 2024-03-28 RX ADMIN — DIPHENHYDRAMINE HYDROCHLORIDE 25 MG: 50 INJECTION, SOLUTION INTRAMUSCULAR; INTRAVENOUS at 16:58

## 2024-03-28 NOTE — DISCHARGE INSTRUCTIONS
Schedule a follow-up appoint with the ophthalmologist and/or your PCP, return to ED for any new or worsening symptoms.

## 2024-03-28 NOTE — ED PROVIDER NOTES
History  Chief Complaint   Patient presents with    Headache     Pt. Comes from PCP office with c/o left sided head pain since 2 hours ago began with photosensitivity and blurred vision at same time. Denies nausea + denies dizziness. Head strike on Saturday, back part of the head on wall, negative LOC, first symptoms that have appeared since, does not know if this is related.        Rolo is a 64 year old male with a history of COPD, hypertension, hyperlipidemia, presenting for evaluation of a headache.  Patient describes that he was sitting at his desk at work approximately 2 hours prior to arrival looking at his computer when the headache began, he states it began on the left side of his head behind his eye, it is gradually worsened though came on suddenly, describes that it is the most severe headache he has ever had.  He endorses associated photophobia, occasional floaters in his vision, though he notes he has had floaters in his vision in the past, denies any new vision changes.  He denies any focal numbness or weakness.  He denies any chest pain or shortness of breath.  Of note, patient does describe that he struck his head 5 days ago, he was going from squatting to standing and hit his head hard on a piece of furniture, he did not lose consciousness during the event, no vomiting after the event.  He does not take any blood thinners including aspirin.  Prior to arrival, patient took 4 Tylenol with no relief in his headache.      History provided by:  Patient and medical records   used: No        Prior to Admission Medications   Prescriptions Last Dose Informant Patient Reported? Taking?   HYDROcodone-acetaminophen (NORCO) 5-325 mg per tablet  Self Yes No   Sig: TAKE 1 TABLET BY MOUTH EVERY DAY AS NEEDED   Patient not taking: Reported on 12/22/2022   HYDROcodone-acetaminophen (XODOL) 5-300 MG per tablet  Self Yes No   Sig: Vicodin 5 mg-300 mg tablet   Patient not taking: Reported on  2022   Multiple Vitamin (MULTIVITAMIN PO)  Self Yes No   Sig: Take by mouth in the morning   budesonide-formoterol (SYMBICORT) 160-4.5 mcg/act inhaler  Self Yes No   Sig: Inhale 2 puffs 2 (two) times a day.   Patient not taking: Reported on 2022   chlorthalidone 25 mg tablet   No No   Sig: Take 0.5 tablets (12.5 mg total) by mouth daily   esomeprazole (NexIUM) 40 MG capsule  Self Yes No   Sig: Take 40 mg by mouth every morning before breakfast.   famotidine (PEPCID) 40 MG tablet  Self Yes No   Patient not taking: Reported on 2022   famotidine (PEPCID) 40 MG tablet  Self Yes No   Sig: famotidine 40 mg tablet   Patient not taking: Reported on 2022   fluticasone-umeclidinium-vilanterol (TRELEGY ELLIPTA) 100-62.5-25 MCG/INH inhaler  Self Yes No   Sig: Inhale 1 puff   fluticasone-umeclidinium-vilanterol (TRELEGY) 100-62.5-25 MCG/INH inhaler  Self Yes No   Si puff od   fluticasone-umeclidinium-vilanterol (Trelegy Ellipta) 200-62.5-25 mcg/actuation AEPB inhaler   Yes No   Sig: Inhale 1 puff daily Rinse mouth after use.   lidocaine (Lidoderm) 5 %   No No   Sig: Apply 1 patch topically daily Remove & Discard patch within 12 hours or as directed by MD   naproxen (Naprosyn) 500 mg tablet   No No   Sig: Take 1 tablet (500 mg total) by mouth 2 (two) times a day with meals   Patient taking differently: Take 500 mg by mouth if needed   oxyCODONE-acetaminophen (PERCOCET) 5-325 mg per tablet  Self Yes No   Sig: Every 12 hours   Patient not taking: Reported on 2022   oxyCODONE-acetaminophen (PERCOCET) 5-325 mg per tablet  Self Yes No   Sig: Take 1 tablet by mouth 2 (two) times a day as needed   Patient not taking: Reported on 2022   rosuvastatin (CRESTOR) 5 mg tablet   Yes No   Sig: Take 5 mg by mouth daily   valsartan (DIOVAN) 80 mg tablet   Yes No   Sig: Take 80 mg by mouth daily      Facility-Administered Medications: None       Past Medical History:   Diagnosis Date    Cancer (HCC)     skin     COPD (chronic obstructive pulmonary disease) (HCC)     GERD (gastroesophageal reflux disease)     HTN (hypertension) 10/27/2023    Hyperlipidemia     Hyperlipidemia 10/27/2023    Hypertension     Kidney stone     Sleep apnea        Past Surgical History:   Procedure Laterality Date    COLONOSCOPY      HERNIA REPAIR      KIDNEY STONE SURGERY      could not find stone    TUMOR EXCISION      fatty tumor neck- benign       Family History   Problem Relation Age of Onset    Hypertension Mother      I have reviewed and agree with the history as documented.    E-Cigarette/Vaping     E-Cigarette/Vaping Substances     Social History     Tobacco Use    Smoking status: Former     Current packs/day: 0.00     Types: Cigarettes     Start date:      Quit date:      Years since quittin.2    Smokeless tobacco: Never   Substance Use Topics    Alcohol use: Yes     Comment: occassional    Drug use: No        Review of Systems   Constitutional:  Negative for chills and fever.   HENT:  Negative for ear pain and sore throat.    Eyes:  Positive for photophobia and visual disturbance. Negative for pain.   Respiratory:  Negative for cough and shortness of breath.    Cardiovascular:  Negative for chest pain and palpitations.   Gastrointestinal:  Negative for abdominal pain and vomiting.   Genitourinary:  Negative for dysuria and hematuria.   Musculoskeletal:  Negative for arthralgias and back pain.   Skin:  Negative for color change and rash.   Neurological:  Positive for headaches. Negative for seizures and syncope.   All other systems reviewed and are negative.      Physical Exam  ED Triage Vitals [24 1620]   Temperature Pulse Respirations Blood Pressure SpO2   97.7 °F (36.5 °C) 74 18 (!) 191/86 95 %      Temp Source Heart Rate Source Patient Position - Orthostatic VS BP Location FiO2 (%)   Oral Monitor Sitting Right arm --      Pain Score       10 - Worst Possible Pain             Orthostatic Vital Signs  Vitals:     03/28/24 1620 03/28/24 1800   BP: (!) 191/86 149/73   Pulse: 74 79   Patient Position - Orthostatic VS: Sitting Sitting       Physical Exam  Vitals and nursing note reviewed.   Constitutional:       General: He is not in acute distress.  HENT:      Head: Normocephalic and atraumatic.      Right Ear: External ear normal.      Left Ear: External ear normal.      Mouth/Throat:      Mouth: Mucous membranes are moist.      Pharynx: Oropharynx is clear.   Eyes:      General: No scleral icterus.     Intraocular pressure: Right eye pressure is 15 mmHg. Left eye pressure is 14 mmHg. Measurements were taken using an automated tonometer.     Extraocular Movements: Extraocular movements intact.      Right eye: Normal extraocular motion.      Left eye: Normal extraocular motion.      Conjunctiva/sclera: Conjunctivae normal.      Pupils: Pupils are equal, round, and reactive to light.   Cardiovascular:      Rate and Rhythm: Normal rate and regular rhythm.      Pulses: Normal pulses.      Heart sounds: Normal heart sounds.   Pulmonary:      Effort: Pulmonary effort is normal. No respiratory distress.      Breath sounds: Normal breath sounds.   Abdominal:      General: Abdomen is flat. There is no distension.      Tenderness: There is no abdominal tenderness.   Musculoskeletal:         General: No tenderness or signs of injury.      Cervical back: Neck supple. No rigidity.      Right lower leg: No edema.      Left lower leg: No edema.   Skin:     General: Skin is warm.      Coloration: Skin is not jaundiced.      Findings: No erythema or rash.   Neurological:      General: No focal deficit present.      Mental Status: He is alert and oriented to person, place, and time. Mental status is at baseline.      Cranial Nerves: No cranial nerve deficit.      Sensory: No sensory deficit.      Motor: No weakness.      Coordination: Coordination normal.      Gait: Gait normal.   Psychiatric:         Mood and Affect: Mood normal.          Behavior: Behavior normal.         ED Medications  Medications   dexamethasone (PF) (DECADRON) injection 10 mg (10 mg Intravenous Given 3/28/24 1658)   metoclopramide (REGLAN) injection 10 mg (10 mg Intravenous Given 3/28/24 1656)   diphenhydrAMINE (BENADRYL) injection 25 mg (25 mg Intravenous Given 3/28/24 1658)   magnesium sulfate 2 g/50 mL IVPB (premix) 2 g (0 g Intravenous Stopped 3/28/24 1744)   sodium chloride 0.9 % bolus 1,000 mL (0 mL Intravenous Stopped 3/28/24 1820)       Diagnostic Studies  Results Reviewed       Procedure Component Value Units Date/Time    Comprehensive metabolic panel [[id number]]  (Abnormal) Collected: 03/28/24 1655    Lab Status: Final result Specimen: Blood from Arm, Left Updated: 03/28/24 1737     Sodium 142 mmol/L      Potassium 4.0 mmol/L      Chloride 106 mmol/L      CO2 31 mmol/L      ANION GAP 5 mmol/L      BUN 26 mg/dL      Creatinine 1.31 mg/dL      Glucose 134 mg/dL      Calcium 9.3 mg/dL      AST 17 U/L      ALT 23 U/L      Alkaline Phosphatase 58 U/L      Total Protein 6.9 g/dL      Albumin 4.2 g/dL      Total Bilirubin 0.70 mg/dL      eGFR 57 ml/min/1.73sq m     Narrative:      National Kidney [employer] Foundation guidelines for Chronic Kidney Disease (CKD):     Stage 1 with normal or high GFR (GFR > 90 mL/min/1.73 square meters)    Stage 2 Mild CKD (GFR = 60-89 mL/min/1.73 square meters)    Stage 3A Moderate CKD (GFR = 45-59 mL/min/1.73 square meters)    Stage 3B Moderate CKD (GFR = 30-44 mL/min/1.73 square meters)    Stage 4 Severe CKD (GFR = 15-29 mL/min/1.73 square meters)    Stage 5 End Stage CKD (GFR <15 mL/min/1.73 square meters)  Note: GFR calculation is accurate only with a steady state creatinine    CBC and differential [[id number]] Collected: 03/28/24 1655    Lab Status: Final result Specimen: Blood from Arm, Left Updated: 03/28/24 1712     WBC 8.45 Thousand/uL      RBC 5.18 Million/uL      Hemoglobin 16.1 g/dL      Hematocrit 48.2 %      MCV 93 fL      MCH 31.1 pg       MCHC 33.4 g/dL      RDW 13.1 %      MPV 10.7 fL      Platelets 182 Thousands/uL      nRBC 0 /100 WBCs      Neutrophils Relative 73 %      Immature Grans % 0 %      Lymphocytes Relative 18 %      Monocytes Relative 7 %      Eosinophils Relative 1 %      Basophils Relative 1 %      Neutrophils Absolute 6.19 Thousands/µL      Absolute Immature Grans 0.03 Thousand/uL      Absolute Lymphocytes 1.51 Thousands/µL      Absolute Monocytes 0.55 Thousand/µL      Eosinophils Absolute 0.11 Thousand/µL      Basophils Absolute 0.06 Thousands/µL                    CT head without contrast   Final Result by Simone Piper DO (03/28 1805)      No acute intracranial abnormality.  Stable chronic microangiopathic changes within the brain.                  Workstation performed: [id number]               Procedures  Procedures      ED Course  ED Course as of 03/28/24 1914   Thu Mar 28, 2024   1805 Headache is resolved, awaiting results of CT scan                             SBIRT 22yo+      Flowsheet Row Most Recent Value   Initial Alcohol Screen: US AUDIT-C     1. How often do you have a drink containing alcohol? 0 Filed at: 03/28/2024 1622   2. How many drinks containing alcohol do you have on a typical day you are drinking?  0 Filed at: 03/28/2024 1622   3a. Male UNDER 65: How often do you have five or more drinks on one occasion? 0 Filed at: 03/28/2024 1622   3b. FEMALE Any Age, or MALE 65+: How often do you have 4 or more drinks on one occassion? 0 Filed at: 03/28/2024 1622   Audit-C Score 0 Filed at: 03/28/2024 1622   BALDEV: How many times in the past year have you...    Used an illegal drug or used a prescription medication for non-medical reasons? Never Filed at: 03/28/2024 1622                  Medical Decision Making  Rolo is a 64 year old male with a history of COPD, hypertension, hyperlipidemia, presenting for evaluation of a headache.  Patient describes a left-sided headache mostly located behind his left eye  with associated photophobia, he also noted some floaters in his vision which he has had in the past but is new with this headache.  He denies any neurological symptoms.  Noted a mild trauma to his head 5 days ago.    Patient on exam was not in any distress, he was hypertensive on arrival, vital signs otherwise unremarkable, hypertension did improve throughout his stay.    Concern for possible subarachnoid hemorrhage, possible subdural or epidural hematoma.  Possible migraine headache, cluster headache.  Possible glaucoma.    Fortunately, intraocular pressures were normal ruling out glaucoma.  CT scan of the head did not show any acute abnormalities.  Patient was treated with a migraine cocktail and headache resolved.  He was stable for discharge at this time, I advised that he follow-up with ophthalmology given the floaters in his vision with associated headache 9 his left eye and provided him with a referral, advised that he follow-up with his PCP, gave strict return precautions, he was understanding and agreeable to plan.    Problems Addressed:  Headache: acute illness or injury  Visual disturbance: acute illness or injury    Amount and/or Complexity of Data Reviewed  Labs: ordered.  Radiology: ordered.    Risk  Prescription drug management.          Disposition  Final diagnoses:   Headache   Visual disturbance     Time reflects when diagnosis was documented in both MDM as applicable and the Disposition within this note       Time User Action Codes Description Comment    3/28/2024  6:11 PM Travon Lance Add [R51.9] Headache     3/28/2024  6:12 PM Travon Lance Add [H53.9] Visual disturbance           ED Disposition       ED Disposition   Discharge    Condition   Stable    Date/Time   Thu Mar 28, 2024 1811    Comment   Rolo Harrison discharge to home/self care.                   Follow-up Information       Follow up With Specialties Details Why Contact Info    Bg Phillips MD Ophthalmology  Schedule an appointment as soon as possible for a visit   [address] [facility]  [address]  [facility] [place] [address]  [phone]      Shoaib Shah MD Internal Medicine   [facility] [place] [id number]  [phone]              Discharge Medication List as of 3/28/2024  6:13 PM        CONTINUE these medications which have NOT CHANGED    Details   budesonide-formoterol (SYMBICORT) 160-4.5 mcg/act inhaler Inhale 2 puffs 2 (two) times a day., Historical Med      chlorthalidone 25 mg tablet Take 0.5 tablets (12.5 mg total) by mouth daily, Starting Thu 12/22/2022, Until Wed 3/22/2023, Normal      esomeprazole (NexIUM) 40 MG capsule Take 40 mg by mouth every morning before breakfast., Historical Med      !! famotidine (PEPCID) 40 MG tablet Starting Tue 2/12/2019, Historical Med      !! famotidine (PEPCID) 40 MG tablet famotidine 40 mg tablet, Historical Med      !! fluticasone-umeclidinium-vilanterol (TRELEGY ELLIPTA) 100-62.5-25 MCG/INH inhaler Inhale 1 puff, Starting Thu 8/23/2018, Historical Med      fluticasone-umeclidinium-vilanterol (Trelegy Ellipta) 200-62.5-25 mcg/actuation AEPB inhaler Inhale 1 puff daily Rinse mouth after use., Historical Med      !! fluticasone-umeclidinium-vilanterol (TRELEGY) 100-62.5-25 MCG/INH inhaler 1 puff od, Historical Med      HYDROcodone-acetaminophen (NORCO) 5-325 mg per tablet TAKE 1 TABLET BY MOUTH EVERY DAY AS NEEDED, Historical Med      HYDROcodone-acetaminophen (XODOL) 5-300 MG per tablet Vicodin 5 mg-300 mg tablet, Historical Med      lidocaine (Lidoderm) 5 % Apply 1 patch topically daily Remove & Discard patch within 12 hours or as directed by MD, Starting Thu 3/10/2022, Normal      Multiple Vitamin (MULTIVITAMIN PO) Take by mouth in the morning, Historical Med      naproxen (Naprosyn) 500 mg tablet Take 1 tablet (500 mg total) by mouth 2 (two) times a day with meals, Starting Thu 3/10/2022, Normal      !! oxyCODONE-acetaminophen (PERCOCET) 5-325 mg per tablet Every 12 hours,  Historical Med      !! oxyCODONE-acetaminophen (PERCOCET) 5-325 mg per tablet Take 1 tablet by mouth 2 (two) times a day as needed, Starting Mon 2/4/2019, Historical Med      rosuvastatin (CRESTOR) 5 mg tablet Take 5 mg by mouth daily, Historical Med      valsartan (DIOVAN) 80 mg tablet Take 80 mg by mouth daily, Historical Med       !! - Potential duplicate medications found. Please discuss with provider.            PDMP Review       None             ED Provider  Attending physically available and evaluated Roloareli Harrison. I managed the patient along with the ED Attending.    Electronically Signed by           Travon Lance DO  03/28/24 2722

## 2024-03-29 NOTE — ED ATTENDING ATTESTATION
3/28/2024  IDameon MD, saw and evaluated the patient. I have discussed the patient with the resident/non-physician practitioner and agree with the resident's/non-physician practitioner's findings, Plan of Care, and MDM as documented in the resident's/non-physician practitioner's note, except where noted. All available labs and Radiology studies were reviewed.  I was present for key portions of any procedure(s) performed by the resident/non-physician practitioner and I was immediately available to provide assistance.       At this point I agree with the current assessment done in the Emergency Department.  I have conducted an independent evaluation of this patient a history and physical is as follows:SEE  H AND P ABOVE -AGREE WITH ER RESIDENT TX PLAN/ DISPO    ED Course  ED Course as of 03/28/24 2325   Thu Mar 28, 2024   1756 Er md note-  pt seen and thoroughly evaluated by er md- case d/w er resident --  64 yr male with hx of htn/copd- in normal state of health - was at work approx 2 hrs ago-  when started to feel in both eyes like he was blinded by light then with gradually worsening pain behind left eye-- had scheduled appt with pmd -  went to see pmd - sent to er for eval -- -- pt feels mildly improved but still has pain behind left eye-- no hx of ha syndromes-   this weekend hit back of head on beam while standing up but no loc-- current no cp/sob / neck pain - no diplopia/ dysarthria/ dysphagia/ dysphonia/ dsymetria- no focal neuro comps- avss- htnsive- pulse ox 95 % on ra- interpretation is normal- no intervention - no pmt c/t/l/s spine- no scalp tenderness-contusion/ hematoma- rrr s1/s2/-cta-b-soft nt/nd- normal non focal neuro exam - no papilledema with panoptic- no carotid bruits-no meningeal signs    1816 Er md note- current labs reviewed and compared to previous by er md         Critical Care Time  Procedures

## 2024-07-16 ENCOUNTER — CONSULT (OUTPATIENT)
Dept: SURGERY | Facility: CLINIC | Age: 64
End: 2024-07-16
Payer: COMMERCIAL

## 2024-07-16 VITALS
SYSTOLIC BLOOD PRESSURE: 120 MMHG | WEIGHT: 209.5 LBS | OXYGEN SATURATION: 96 % | BODY MASS INDEX: 26.89 KG/M2 | HEIGHT: 74 IN | DIASTOLIC BLOOD PRESSURE: 60 MMHG | TEMPERATURE: 97.6 F | HEART RATE: 77 BPM

## 2024-07-16 DIAGNOSIS — R22.2 MASS OF CHEST WALL, RIGHT: Primary | ICD-10-CM

## 2024-07-16 DIAGNOSIS — D17.79 BENIGN LIPOMATOUS NEOPLASM OF OTHER SITES: ICD-10-CM

## 2024-07-16 PROCEDURE — 99242 OFF/OP CONSLTJ NEW/EST SF 20: CPT | Performed by: SURGERY

## 2024-07-16 RX ORDER — GABAPENTIN 300 MG/1
CAPSULE ORAL
COMMUNITY
Start: 2024-07-09

## 2024-07-16 NOTE — PROGRESS NOTES
"Assessment/Plan:     Diagnoses and all orders for this visit:    Mass of chest wall, right    Benign lipomatous neoplasm of other sites  -     Ambulatory Referral to General Surgery    Other orders  -     gabapentin (NEURONTIN) 300 mg capsule; As needed     Patient is being scheduled for excision of mass on the right chest wall under local anesthesia at the office.    Subjective:      Patient ID: Rolo Harrison is a 64 y.o. male.  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  Patient has no other complaints.    The following portions of the patient's history were reviewed and updated as appropriate: allergies, current medications, past family history, past medical history, past social history, past surgical history, and problem list.    Review of Systems    COPD  Essential hypertension    Objective:    /60 (BP Location: Left arm, Patient Position: Sitting, Cuff Size: Standard)   Pulse 77   Temp 97.6 °F (36.4 °C) (Tympanic)   Ht 6' 2\" (1.88 m)   Wt 95 kg (209 lb 8 oz)   SpO2 96%   BMI 26.90 kg/m²      Physical Exam    Patient has a subcutaneous mass on the right lateral chest wall.  This mass measures 4 x 3.5 cm in size.  Clinically this feels like a lipoma.  "

## 2024-07-22 ENCOUNTER — PROCEDURE VISIT (OUTPATIENT)
Dept: SURGERY | Facility: CLINIC | Age: 64
End: 2024-07-22

## 2024-07-22 VITALS
DIASTOLIC BLOOD PRESSURE: 60 MMHG | SYSTOLIC BLOOD PRESSURE: 126 MMHG | WEIGHT: 207 LBS | HEIGHT: 74 IN | OXYGEN SATURATION: 96 % | TEMPERATURE: 96.9 F | HEART RATE: 77 BPM | BODY MASS INDEX: 26.56 KG/M2

## 2024-07-22 DIAGNOSIS — R22.2 MASS OF CHEST WALL, RIGHT: Primary | ICD-10-CM

## 2024-07-22 PROCEDURE — 88304 TISSUE EXAM BY PATHOLOGIST: CPT | Performed by: PATHOLOGY

## 2024-07-22 NOTE — PROGRESS NOTES
Procedures    Diagnosis: Mass right anterior chest wall, size 4.5 cm    Procedure: Excision biopsy    Written consent obtained from patient    Skin prepped with ChloraPrep    Local anesthesia: 20 mL of 1% lidocaine with 1 in 200,000 epinephrine with sodium bicarbonate    Specimen excised and sent for pathological examination    Subcutaneous and subcuticular tissues closed with interrupted 4-0 Vicryl    Dressing applied    Patient tolerated procedure well

## 2024-07-25 PROCEDURE — 88304 TISSUE EXAM BY PATHOLOGIST: CPT | Performed by: PATHOLOGY

## 2024-11-20 ENCOUNTER — OFFICE VISIT (OUTPATIENT)
Dept: URGENT CARE | Facility: CLINIC | Age: 64
End: 2024-11-20
Payer: COMMERCIAL

## 2024-11-20 ENCOUNTER — RESULTS FOLLOW-UP (OUTPATIENT)
Dept: URGENT CARE | Facility: CLINIC | Age: 64
End: 2024-11-20

## 2024-11-20 ENCOUNTER — APPOINTMENT (OUTPATIENT)
Dept: RADIOLOGY | Facility: CLINIC | Age: 64
End: 2024-11-20
Payer: COMMERCIAL

## 2024-11-20 VITALS
WEIGHT: 207 LBS | SYSTOLIC BLOOD PRESSURE: 136 MMHG | HEIGHT: 74 IN | DIASTOLIC BLOOD PRESSURE: 67 MMHG | HEART RATE: 67 BPM | BODY MASS INDEX: 26.56 KG/M2 | RESPIRATION RATE: 18 BRPM | TEMPERATURE: 98.5 F | OXYGEN SATURATION: 96 %

## 2024-11-20 DIAGNOSIS — J06.9 VIRAL URI WITH COUGH: Primary | ICD-10-CM

## 2024-11-20 DIAGNOSIS — J06.9 VIRAL URI WITH COUGH: ICD-10-CM

## 2024-11-20 PROCEDURE — 71046 X-RAY EXAM CHEST 2 VIEWS: CPT

## 2024-11-20 PROCEDURE — S9083 URGENT CARE CENTER GLOBAL: HCPCS | Performed by: FAMILY MEDICINE

## 2024-11-20 PROCEDURE — G0383 LEV 4 HOSP TYPE B ED VISIT: HCPCS | Performed by: FAMILY MEDICINE

## 2024-11-20 RX ORDER — PREDNISONE 20 MG/1
20 TABLET ORAL 2 TIMES DAILY WITH MEALS
Qty: 8 TABLET | Refills: 0 | Status: SHIPPED | OUTPATIENT
Start: 2024-11-20

## 2024-11-20 NOTE — PROGRESS NOTES
[facility]        NAME: Rolo Harrison is a 64 y.o. male  : 1960    MRN: [id number]  DATE: 2024  TIME: 8:57 AM    Assessment and Plan   Viral URI with cough [J06.9]  1. Viral URI with cough  XR chest pa and lateral    predniSONE 20 mg tablet            Patient Instructions     Steroids given for congestion. No signs of bacterial infection today. Follow up with PCP in 3-5 days if no improvement. Proceed to ER if symptoms worsen.    Chief Complaint     Chief Complaint   Patient presents with    Flu Symptoms     Py has cough, fever, chills, and body aches that started yesterday. Pt took flu cold medicine.          History of Present Illness     Rolo Harrison is a 64 y.o. male presenting to the office today for upper respiratory complaints. Symptoms have been present for 1 days, and include cough and congestion. He has a hx of COPD and is concerned. He notes he typically gets steroids and a Zpak when his symptoms start. He is requesting more of the same.     Review of Systems     Review of Systems   Constitutional:  Positive for fever. Negative for chills and fatigue.   HENT:  Positive for congestion and postnasal drip. Negative for ear discharge, ear pain, sinus pressure, sinus pain and sore throat.    Eyes:  Negative for pain and discharge.   Respiratory:  Positive for cough. Negative for shortness of breath.    Cardiovascular:  Negative for chest pain and palpitations.   Gastrointestinal:  Negative for abdominal pain, diarrhea, nausea and vomiting.   Genitourinary:  Negative for difficulty urinating and dysuria.   Musculoskeletal:  Negative for arthralgias and myalgias.   Skin:  Negative for rash.   Neurological:  Negative for dizziness, syncope, light-headedness, numbness and headaches.   Psychiatric/Behavioral:  Negative for agitation.    All other systems reviewed and are negative.      Current Medications       Current Outpatient Medications:     esomeprazole (NexIUM) 40 MG capsule,  "Take 40 mg by mouth every morning before breakfast., Disp: , Rfl:     fluticasone-umeclidinium-vilanterol (Trelegy Ellipta) 200-62.5-25 mcg/actuation AEPB inhaler, Inhale 1 puff daily Rinse mouth after use., Disp: , Rfl:     gabapentin (NEURONTIN) 300 mg capsule, As needed, Disp: , Rfl:     Multiple Vitamin (MULTIVITAMIN PO), Take by mouth in the morning, Disp: , Rfl:     naproxen (Naprosyn) 500 mg tablet, Take 1 tablet (500 mg total) by mouth 2 (two) times a day with meals, Disp: 30 tablet, Rfl: 0    predniSONE 20 mg tablet, Take 1 tablet (20 mg total) by mouth 2 (two) times a day with meals, Disp: 8 tablet, Rfl: 0    valsartan (DIOVAN) 80 mg tablet, Take 80 mg by mouth daily, Disp: , Rfl:     Current Allergies     Allergies as of 11/20/2024    (No Known Allergies)            The following portions of the patient's history were reviewed and updated as appropriate: allergies, current medications, past family history, past medical history, past social history, past surgical history and problem list.     Past Medical History:   Diagnosis Date    Cancer (HCC)     skin    COPD (chronic obstructive pulmonary disease) (HCC)     GERD (gastroesophageal reflux disease)     HTN (hypertension) 10/27/2023    Hyperlipidemia     Hyperlipidemia 10/27/2023    Hypertension     Kidney stone     Sleep apnea        Past Surgical History:   Procedure Laterality Date    COLONOSCOPY      HERNIA REPAIR      KIDNEY STONE SURGERY      could not find stone    TUMOR EXCISION      fatty tumor neck- benign       Family History   Problem Relation Age of Onset    Hypertension Mother        Medications have been verified.    Objective     /67   Pulse 67   Temp 98.5 °F (36.9 °C)   Resp 18   Ht 6' 2\" (1.88 m)   Wt 93.9 kg (207 lb)   SpO2 96%   BMI 26.58 kg/m²   No LMP for male patient.     Physical Exam     Physical Exam  Vitals reviewed.   Constitutional:       General: He is not in acute distress.     Appearance: Normal appearance. He " is not ill-appearing.   HENT:      Head: Normocephalic and atraumatic.      Right Ear: Tympanic membrane and ear canal normal. No middle ear effusion.      Left Ear: Tympanic membrane and ear canal normal.  No middle ear effusion.      Mouth/Throat:      Mouth: Mucous membranes are moist.      Pharynx: Postnasal drip present. No oropharyngeal exudate.      Tonsils: No tonsillar exudate.   Eyes:      Extraocular Movements: Extraocular movements intact.      Conjunctiva/sclera: Conjunctivae normal.      Pupils: Pupils are equal, round, and reactive to light.   Cardiovascular:      Rate and Rhythm: Normal rate and regular rhythm.      Pulses: Normal pulses.      Heart sounds: Normal heart sounds. No murmur heard.  Pulmonary:      Effort: Pulmonary effort is normal. No respiratory distress.      Breath sounds: Normal breath sounds. No wheezing.   Abdominal:      General: Bowel sounds are normal. There is no distension.      Palpations: Abdomen is soft.      Tenderness: There is no abdominal tenderness. There is no guarding.   Musculoskeletal:      Cervical back: Normal range of motion and neck supple. No tenderness.   Skin:     General: Skin is warm.   Neurological:      General: No focal deficit present.      Mental Status: He is alert.   Psychiatric:         Mood and Affect: Mood normal.         Behavior: Behavior normal.         Judgment: Judgment normal.           Chest XR negative for acute findings.